# Patient Record
Sex: FEMALE | Race: WHITE | Employment: OTHER
[De-identification: names, ages, dates, MRNs, and addresses within clinical notes are randomized per-mention and may not be internally consistent; named-entity substitution may affect disease eponyms.]

---

## 2017-08-26 ENCOUNTER — SURGERY (OUTPATIENT)
Age: 52
End: 2017-08-26

## 2017-08-26 ENCOUNTER — ANESTHESIA (OUTPATIENT)
Dept: SURGERY | Facility: CLINIC | Age: 52
End: 2017-08-26
Payer: MEDICARE

## 2017-08-26 ENCOUNTER — HOSPITAL ENCOUNTER (OUTPATIENT)
Facility: CLINIC | Age: 52
Setting detail: OBSERVATION
Discharge: HOME | End: 2017-08-27
Attending: EMERGENCY MEDICINE | Admitting: SURGERY
Payer: MEDICARE

## 2017-08-26 ENCOUNTER — ANESTHESIA EVENT (OUTPATIENT)
Dept: SURGERY | Facility: CLINIC | Age: 52
End: 2017-08-26
Payer: MEDICARE

## 2017-08-26 ENCOUNTER — APPOINTMENT (OUTPATIENT)
Dept: CT IMAGING | Facility: CLINIC | Age: 52
End: 2017-08-26
Attending: EMERGENCY MEDICINE
Payer: MEDICARE

## 2017-08-26 DIAGNOSIS — K37 APPENDICITIS: ICD-10-CM

## 2017-08-26 DIAGNOSIS — Z90.49 S/P LAPAROSCOPIC APPENDECTOMY: Primary | ICD-10-CM

## 2017-08-26 DIAGNOSIS — K35.80 ACUTE APPENDICITIS, UNSPECIFIED ACUTE APPENDICITIS TYPE: ICD-10-CM

## 2017-08-26 LAB
ALBUMIN SERPL-MCNC: 3.2 G/DL (ref 3.4–5)
ALBUMIN UR-MCNC: 10 MG/DL
ALP SERPL-CCNC: 101 U/L (ref 40–150)
ALT SERPL W P-5'-P-CCNC: 68 U/L (ref 0–50)
ANION GAP SERPL CALCULATED.3IONS-SCNC: 8 MMOL/L (ref 3–14)
APPEARANCE UR: CLEAR
AST SERPL W P-5'-P-CCNC: 81 U/L (ref 0–45)
BACTERIA #/AREA URNS HPF: ABNORMAL /HPF
BASOPHILS # BLD AUTO: 0.1 10E9/L (ref 0–0.2)
BASOPHILS NFR BLD AUTO: 0.4 %
BILIRUB SERPL-MCNC: 0.8 MG/DL (ref 0.2–1.3)
BILIRUB UR QL STRIP: NEGATIVE
BUN SERPL-MCNC: 5 MG/DL (ref 7–30)
CALCIUM SERPL-MCNC: 9 MG/DL (ref 8.5–10.1)
CHLORIDE SERPL-SCNC: 106 MMOL/L (ref 94–109)
CO2 SERPL-SCNC: 25 MMOL/L (ref 20–32)
COLOR UR AUTO: YELLOW
CREAT SERPL-MCNC: 0.53 MG/DL (ref 0.52–1.04)
DIFFERENTIAL METHOD BLD: ABNORMAL
EOSINOPHIL # BLD AUTO: 0.9 10E9/L (ref 0–0.7)
EOSINOPHIL NFR BLD AUTO: 5.3 %
ERYTHROCYTE [DISTWIDTH] IN BLOOD BY AUTOMATED COUNT: 12.5 % (ref 10–15)
GFR SERPL CREATININE-BSD FRML MDRD: >90 ML/MIN/1.7M2
GLUCOSE SERPL-MCNC: 99 MG/DL (ref 70–99)
GLUCOSE UR STRIP-MCNC: NEGATIVE MG/DL
HCT VFR BLD AUTO: 37.6 % (ref 35–47)
HGB BLD-MCNC: 12.7 G/DL (ref 11.7–15.7)
HGB UR QL STRIP: NEGATIVE
IMM GRANULOCYTES # BLD: 0.1 10E9/L (ref 0–0.4)
IMM GRANULOCYTES NFR BLD: 0.4 %
KETONES UR STRIP-MCNC: NEGATIVE MG/DL
LEUKOCYTE ESTERASE UR QL STRIP: ABNORMAL
LYMPHOCYTES # BLD AUTO: 2.5 10E9/L (ref 0.8–5.3)
LYMPHOCYTES NFR BLD AUTO: 15.5 %
MCH RBC QN AUTO: 33.1 PG (ref 26.5–33)
MCHC RBC AUTO-ENTMCNC: 33.8 G/DL (ref 31.5–36.5)
MCV RBC AUTO: 98 FL (ref 78–100)
MONOCYTES # BLD AUTO: 2 10E9/L (ref 0–1.3)
MONOCYTES NFR BLD AUTO: 12.2 %
MUCOUS THREADS #/AREA URNS LPF: PRESENT /LPF
NEUTROPHILS # BLD AUTO: 10.7 10E9/L (ref 1.6–8.3)
NEUTROPHILS NFR BLD AUTO: 66.2 %
NITRATE UR QL: NEGATIVE
PH UR STRIP: 8.5 PH (ref 5–7)
PLATELET # BLD AUTO: 393 10E9/L (ref 150–450)
POTASSIUM SERPL-SCNC: 3.9 MMOL/L (ref 3.4–5.3)
PROT SERPL-MCNC: 7.9 G/DL (ref 6.8–8.8)
RBC # BLD AUTO: 3.84 10E12/L (ref 3.8–5.2)
RBC #/AREA URNS AUTO: 1 /HPF (ref 0–2)
SODIUM SERPL-SCNC: 139 MMOL/L (ref 133–144)
SOURCE: ABNORMAL
SP GR UR STRIP: 1.01 (ref 1–1.03)
SQUAMOUS #/AREA URNS AUTO: 1 /HPF (ref 0–1)
TRANS CELLS #/AREA URNS HPF: 2 /HPF (ref 0–1)
UROBILINOGEN UR STRIP-MCNC: NORMAL MG/DL (ref 0–2)
WBC # BLD AUTO: 16.2 10E9/L (ref 4–11)
WBC #/AREA URNS AUTO: 17 /HPF (ref 0–2)

## 2017-08-26 PROCEDURE — 25000125 ZZHC RX 250: Performed by: NURSE ANESTHETIST, CERTIFIED REGISTERED

## 2017-08-26 PROCEDURE — G0378 HOSPITAL OBSERVATION PER HR: HCPCS

## 2017-08-26 PROCEDURE — 25000128 H RX IP 250 OP 636: Performed by: SURGERY

## 2017-08-26 PROCEDURE — 99222 1ST HOSP IP/OBS MODERATE 55: CPT | Mod: 57 | Performed by: SURGERY

## 2017-08-26 PROCEDURE — 96365 THER/PROPH/DIAG IV INF INIT: CPT

## 2017-08-26 PROCEDURE — 25000132 ZZH RX MED GY IP 250 OP 250 PS 637: Mod: GY | Performed by: SURGERY

## 2017-08-26 PROCEDURE — 25000128 H RX IP 250 OP 636: Performed by: NURSE ANESTHETIST, CERTIFIED REGISTERED

## 2017-08-26 PROCEDURE — 27210794 ZZH OR GENERAL SUPPLY STERILE: Performed by: SURGERY

## 2017-08-26 PROCEDURE — 36000056 ZZH SURGERY LEVEL 3 1ST 30 MIN: Performed by: SURGERY

## 2017-08-26 PROCEDURE — 25000125 ZZHC RX 250: Performed by: EMERGENCY MEDICINE

## 2017-08-26 PROCEDURE — 74177 CT ABD & PELVIS W/CONTRAST: CPT

## 2017-08-26 PROCEDURE — 85025 COMPLETE CBC W/AUTO DIFF WBC: CPT | Performed by: EMERGENCY MEDICINE

## 2017-08-26 PROCEDURE — 25000125 ZZHC RX 250: Performed by: SURGERY

## 2017-08-26 PROCEDURE — 96361 HYDRATE IV INFUSION ADD-ON: CPT

## 2017-08-26 PROCEDURE — 44970 LAPAROSCOPY APPENDECTOMY: CPT | Performed by: SURGERY

## 2017-08-26 PROCEDURE — 37000009 ZZH ANESTHESIA TECHNICAL FEE, EACH ADDTL 15 MIN: Performed by: SURGERY

## 2017-08-26 PROCEDURE — 25000128 H RX IP 250 OP 636: Performed by: EMERGENCY MEDICINE

## 2017-08-26 PROCEDURE — 96375 TX/PRO/DX INJ NEW DRUG ADDON: CPT

## 2017-08-26 PROCEDURE — 37000008 ZZH ANESTHESIA TECHNICAL FEE, 1ST 30 MIN: Performed by: SURGERY

## 2017-08-26 PROCEDURE — 25000564 ZZH DESFLURANE, EA 15 MIN: Performed by: SURGERY

## 2017-08-26 PROCEDURE — 27110028 ZZH OR GENERAL SUPPLY NON-STERILE: Performed by: SURGERY

## 2017-08-26 PROCEDURE — 81001 URINALYSIS AUTO W/SCOPE: CPT | Performed by: EMERGENCY MEDICINE

## 2017-08-26 PROCEDURE — 99285 EMERGENCY DEPT VISIT HI MDM: CPT | Performed by: EMERGENCY MEDICINE

## 2017-08-26 PROCEDURE — 99285 EMERGENCY DEPT VISIT HI MDM: CPT | Mod: 25

## 2017-08-26 PROCEDURE — 25000564 ZZH DESFLURANE, EA 15 MIN

## 2017-08-26 PROCEDURE — 36000058 ZZH SURGERY LEVEL 3 EA 15 ADDTL MIN: Performed by: SURGERY

## 2017-08-26 PROCEDURE — 88304 TISSUE EXAM BY PATHOLOGIST: CPT | Performed by: SURGERY

## 2017-08-26 PROCEDURE — A9270 NON-COVERED ITEM OR SERVICE: HCPCS | Mod: GY | Performed by: SURGERY

## 2017-08-26 PROCEDURE — 88304 TISSUE EXAM BY PATHOLOGIST: CPT | Mod: 26 | Performed by: SURGERY

## 2017-08-26 PROCEDURE — 80053 COMPREHEN METABOLIC PANEL: CPT | Performed by: EMERGENCY MEDICINE

## 2017-08-26 PROCEDURE — 71000014 ZZH RECOVERY PHASE 1 LEVEL 2 FIRST HR: Performed by: SURGERY

## 2017-08-26 PROCEDURE — 40000671 ZZH STATISTIC ANESTHESIA CASE

## 2017-08-26 RX ORDER — CROMOLYN SODIUM 40 MG/ML
2 SOLUTION/ DROPS OPHTHALMIC 4 TIMES DAILY
COMMUNITY
End: 2021-05-24

## 2017-08-26 RX ORDER — CIPROFLOXACIN 2 MG/ML
400 INJECTION, SOLUTION INTRAVENOUS ONCE
Status: COMPLETED | OUTPATIENT
Start: 2017-08-26 | End: 2017-08-26

## 2017-08-26 RX ORDER — CHLORHEXIDINE GLUCONATE 40 MG/ML
SOLUTION TOPICAL ONCE
Status: DISCONTINUED | OUTPATIENT
Start: 2017-08-26 | End: 2017-08-27 | Stop reason: HOSPADM

## 2017-08-26 RX ORDER — ONDANSETRON 2 MG/ML
INJECTION INTRAMUSCULAR; INTRAVENOUS PRN
Status: DISCONTINUED | OUTPATIENT
Start: 2017-08-26 | End: 2017-08-26

## 2017-08-26 RX ORDER — GLYCOPYRROLATE 0.2 MG/ML
INJECTION, SOLUTION INTRAMUSCULAR; INTRAVENOUS PRN
Status: DISCONTINUED | OUTPATIENT
Start: 2017-08-26 | End: 2017-08-26

## 2017-08-26 RX ORDER — TRIAMCINOLONE ACETONIDE 1 MG/ML
LOTION TOPICAL 2 TIMES DAILY
COMMUNITY

## 2017-08-26 RX ORDER — KETOROLAC TROMETHAMINE 30 MG/ML
INJECTION, SOLUTION INTRAMUSCULAR; INTRAVENOUS PRN
Status: DISCONTINUED | OUTPATIENT
Start: 2017-08-26 | End: 2017-08-26

## 2017-08-26 RX ORDER — NALOXONE HYDROCHLORIDE 0.4 MG/ML
.1-.4 INJECTION, SOLUTION INTRAMUSCULAR; INTRAVENOUS; SUBCUTANEOUS
Status: DISCONTINUED | OUTPATIENT
Start: 2017-08-26 | End: 2017-08-27 | Stop reason: HOSPADM

## 2017-08-26 RX ORDER — SODIUM CHLORIDE, SODIUM LACTATE, POTASSIUM CHLORIDE, CALCIUM CHLORIDE 600; 310; 30; 20 MG/100ML; MG/100ML; MG/100ML; MG/100ML
INJECTION, SOLUTION INTRAVENOUS CONTINUOUS
Status: DISCONTINUED | OUTPATIENT
Start: 2017-08-26 | End: 2017-08-27 | Stop reason: HOSPADM

## 2017-08-26 RX ORDER — FENTANYL CITRATE 50 UG/ML
25-50 INJECTION, SOLUTION INTRAMUSCULAR; INTRAVENOUS
Status: DISCONTINUED | OUTPATIENT
Start: 2017-08-26 | End: 2017-08-26 | Stop reason: HOSPADM

## 2017-08-26 RX ORDER — ONDANSETRON 2 MG/ML
4 INJECTION INTRAMUSCULAR; INTRAVENOUS ONCE
Status: COMPLETED | OUTPATIENT
Start: 2017-08-26 | End: 2017-08-26

## 2017-08-26 RX ORDER — PROPOFOL 10 MG/ML
INJECTION, EMULSION INTRAVENOUS PRN
Status: DISCONTINUED | OUTPATIENT
Start: 2017-08-26 | End: 2017-08-26

## 2017-08-26 RX ORDER — LIDOCAINE HYDROCHLORIDE 10 MG/ML
INJECTION, SOLUTION INFILTRATION; PERINEURAL PRN
Status: DISCONTINUED | OUTPATIENT
Start: 2017-08-26 | End: 2017-08-26

## 2017-08-26 RX ORDER — SODIUM CHLORIDE 9 MG/ML
1000 INJECTION, SOLUTION INTRAVENOUS CONTINUOUS
Status: DISCONTINUED | OUTPATIENT
Start: 2017-08-26 | End: 2017-08-26

## 2017-08-26 RX ORDER — CLOBETASOL PROPIONATE 0.5 MG/ML
SOLUTION TOPICAL 2 TIMES DAILY
COMMUNITY

## 2017-08-26 RX ORDER — ONDANSETRON 4 MG/1
4 TABLET, ORALLY DISINTEGRATING ORAL EVERY 6 HOURS PRN
Status: DISCONTINUED | OUTPATIENT
Start: 2017-08-26 | End: 2017-08-27 | Stop reason: HOSPADM

## 2017-08-26 RX ORDER — HYDRALAZINE HYDROCHLORIDE 20 MG/ML
2.5-5 INJECTION INTRAMUSCULAR; INTRAVENOUS EVERY 10 MIN PRN
Status: DISCONTINUED | OUTPATIENT
Start: 2017-08-26 | End: 2017-08-26 | Stop reason: HOSPADM

## 2017-08-26 RX ORDER — HYDROMORPHONE HYDROCHLORIDE 1 MG/ML
.3-.5 INJECTION, SOLUTION INTRAMUSCULAR; INTRAVENOUS; SUBCUTANEOUS EVERY 5 MIN PRN
Status: DISCONTINUED | OUTPATIENT
Start: 2017-08-26 | End: 2017-08-26 | Stop reason: HOSPADM

## 2017-08-26 RX ORDER — FLUTICASONE PROPIONATE 50 MCG
1 SPRAY, SUSPENSION (ML) NASAL DAILY
COMMUNITY

## 2017-08-26 RX ORDER — FENTANYL CITRATE 50 UG/ML
75 INJECTION, SOLUTION INTRAMUSCULAR; INTRAVENOUS ONCE
Status: COMPLETED | OUTPATIENT
Start: 2017-08-26 | End: 2017-08-26

## 2017-08-26 RX ORDER — CIPROFLOXACIN 2 MG/ML
INJECTION, SOLUTION INTRAVENOUS PRN
Status: DISCONTINUED | OUTPATIENT
Start: 2017-08-26 | End: 2017-08-26

## 2017-08-26 RX ORDER — KETOROLAC TROMETHAMINE 30 MG/ML
30 INJECTION, SOLUTION INTRAMUSCULAR; INTRAVENOUS
Status: DISCONTINUED | OUTPATIENT
Start: 2017-08-26 | End: 2017-08-26 | Stop reason: HOSPADM

## 2017-08-26 RX ORDER — CHLORHEXIDINE GLUCONATE ORAL RINSE 1.2 MG/ML
15 SOLUTION DENTAL 2 TIMES DAILY
COMMUNITY
End: 2021-05-24

## 2017-08-26 RX ORDER — OXYCODONE AND ACETAMINOPHEN 5; 325 MG/1; MG/1
1-2 TABLET ORAL EVERY 4 HOURS PRN
Status: DISCONTINUED | OUTPATIENT
Start: 2017-08-26 | End: 2017-08-26 | Stop reason: HOSPADM

## 2017-08-26 RX ORDER — KETOROLAC TROMETHAMINE 30 MG/ML
30 INJECTION, SOLUTION INTRAMUSCULAR; INTRAVENOUS EVERY 6 HOURS PRN
Status: DISCONTINUED | OUTPATIENT
Start: 2017-08-26 | End: 2017-08-27 | Stop reason: HOSPADM

## 2017-08-26 RX ORDER — SODIUM CHLORIDE, SODIUM LACTATE, POTASSIUM CHLORIDE, CALCIUM CHLORIDE 600; 310; 30; 20 MG/100ML; MG/100ML; MG/100ML; MG/100ML
INJECTION, SOLUTION INTRAVENOUS CONTINUOUS
Status: DISCONTINUED | OUTPATIENT
Start: 2017-08-26 | End: 2017-08-26 | Stop reason: HOSPADM

## 2017-08-26 RX ORDER — ONDANSETRON 2 MG/ML
4 INJECTION INTRAMUSCULAR; INTRAVENOUS EVERY 30 MIN PRN
Status: DISCONTINUED | OUTPATIENT
Start: 2017-08-26 | End: 2017-08-26 | Stop reason: HOSPADM

## 2017-08-26 RX ORDER — ONDANSETRON 2 MG/ML
4 INJECTION INTRAMUSCULAR; INTRAVENOUS EVERY 6 HOURS PRN
Status: DISCONTINUED | OUTPATIENT
Start: 2017-08-26 | End: 2017-08-27 | Stop reason: HOSPADM

## 2017-08-26 RX ORDER — FENTANYL CITRATE 50 UG/ML
INJECTION, SOLUTION INTRAMUSCULAR; INTRAVENOUS PRN
Status: DISCONTINUED | OUTPATIENT
Start: 2017-08-26 | End: 2017-08-26

## 2017-08-26 RX ORDER — OXYCODONE AND ACETAMINOPHEN 5; 325 MG/1; MG/1
1-2 TABLET ORAL EVERY 4 HOURS PRN
Status: DISCONTINUED | OUTPATIENT
Start: 2017-08-26 | End: 2017-08-27 | Stop reason: HOSPADM

## 2017-08-26 RX ORDER — HYDROMORPHONE HYDROCHLORIDE 1 MG/ML
.3-.5 INJECTION, SOLUTION INTRAMUSCULAR; INTRAVENOUS; SUBCUTANEOUS
Status: DISCONTINUED | OUTPATIENT
Start: 2017-08-26 | End: 2017-08-27 | Stop reason: HOSPADM

## 2017-08-26 RX ORDER — METOCLOPRAMIDE HYDROCHLORIDE 5 MG/ML
10 INJECTION INTRAMUSCULAR; INTRAVENOUS EVERY 6 HOURS
Status: DISCONTINUED | OUTPATIENT
Start: 2017-08-26 | End: 2017-08-27 | Stop reason: HOSPADM

## 2017-08-26 RX ORDER — PROMETHAZINE HYDROCHLORIDE 25 MG/ML
25 INJECTION, SOLUTION INTRAMUSCULAR; INTRAVENOUS EVERY 6 HOURS PRN
Status: DISCONTINUED | OUTPATIENT
Start: 2017-08-26 | End: 2017-08-27 | Stop reason: HOSPADM

## 2017-08-26 RX ORDER — ONDANSETRON 4 MG/1
4 TABLET, ORALLY DISINTEGRATING ORAL EVERY 30 MIN PRN
Status: DISCONTINUED | OUTPATIENT
Start: 2017-08-26 | End: 2017-08-26 | Stop reason: HOSPADM

## 2017-08-26 RX ORDER — BUPIVACAINE HYDROCHLORIDE AND EPINEPHRINE 2.5; 5 MG/ML; UG/ML
INJECTION, SOLUTION INFILTRATION; PERINEURAL PRN
Status: DISCONTINUED | OUTPATIENT
Start: 2017-08-26 | End: 2017-08-26 | Stop reason: HOSPADM

## 2017-08-26 RX ORDER — MEPERIDINE HYDROCHLORIDE 25 MG/ML
12.5 INJECTION INTRAMUSCULAR; INTRAVENOUS; SUBCUTANEOUS EVERY 5 MIN PRN
Status: DISCONTINUED | OUTPATIENT
Start: 2017-08-26 | End: 2017-08-26 | Stop reason: HOSPADM

## 2017-08-26 RX ORDER — DEXAMETHASONE SODIUM PHOSPHATE 4 MG/ML
INJECTION, SOLUTION INTRA-ARTICULAR; INTRALESIONAL; INTRAMUSCULAR; INTRAVENOUS; SOFT TISSUE PRN
Status: DISCONTINUED | OUTPATIENT
Start: 2017-08-26 | End: 2017-08-26

## 2017-08-26 RX ORDER — IOPAMIDOL 755 MG/ML
94 INJECTION, SOLUTION INTRAVASCULAR ONCE
Status: COMPLETED | OUTPATIENT
Start: 2017-08-26 | End: 2017-08-26

## 2017-08-26 RX ADMIN — HYDROMORPHONE HYDROCHLORIDE 1 MG: 1 INJECTION, SOLUTION INTRAMUSCULAR; INTRAVENOUS; SUBCUTANEOUS at 16:46

## 2017-08-26 RX ADMIN — FENTANYL CITRATE 100 MCG: 50 INJECTION, SOLUTION INTRAMUSCULAR; INTRAVENOUS at 16:26

## 2017-08-26 RX ADMIN — CIPROFLOXACIN 400 MG: 2 INJECTION INTRAVENOUS at 14:46

## 2017-08-26 RX ADMIN — CIPROFLOXACIN 400 MG: 2 INJECTION, SOLUTION INTRAVENOUS at 15:44

## 2017-08-26 RX ADMIN — MIDAZOLAM HYDROCHLORIDE 2 MG: 1 INJECTION, SOLUTION INTRAMUSCULAR; INTRAVENOUS at 16:21

## 2017-08-26 RX ADMIN — Medication 100 MG: at 16:28

## 2017-08-26 RX ADMIN — KETOROLAC TROMETHAMINE 30 MG: 30 INJECTION, SOLUTION INTRAMUSCULAR at 16:40

## 2017-08-26 RX ADMIN — METOCLOPRAMIDE 10 MG: 5 INJECTION, SOLUTION INTRAMUSCULAR; INTRAVENOUS at 18:54

## 2017-08-26 RX ADMIN — FENTANYL CITRATE 100 MCG: 50 INJECTION, SOLUTION INTRAMUSCULAR; INTRAVENOUS at 16:23

## 2017-08-26 RX ADMIN — METOCLOPRAMIDE 10 MG: 5 INJECTION, SOLUTION INTRAMUSCULAR; INTRAVENOUS at 23:39

## 2017-08-26 RX ADMIN — PROPOFOL 200 MG: 10 INJECTION, EMULSION INTRAVENOUS at 16:28

## 2017-08-26 RX ADMIN — DEXAMETHASONE SODIUM PHOSPHATE 4 MG: 4 INJECTION, SOLUTION INTRA-ARTICULAR; INTRALESIONAL; INTRAMUSCULAR; INTRAVENOUS; SOFT TISSUE at 16:28

## 2017-08-26 RX ADMIN — FENTANYL CITRATE 50 MCG: 50 INJECTION INTRAMUSCULAR; INTRAVENOUS at 17:48

## 2017-08-26 RX ADMIN — BUPIVACAINE HYDROCHLORIDE AND EPINEPHRINE BITARTRATE 28 ML: 2.5; .005 INJECTION, SOLUTION INFILTRATION; PERINEURAL at 17:04

## 2017-08-26 RX ADMIN — OXYCODONE HYDROCHLORIDE AND ACETAMINOPHEN 2 TABLET: 5; 325 TABLET ORAL at 23:39

## 2017-08-26 RX ADMIN — FENTANYL CITRATE 100 MCG: 50 INJECTION, SOLUTION INTRAMUSCULAR; INTRAVENOUS at 16:52

## 2017-08-26 RX ADMIN — IOPAMIDOL 94 ML: 755 INJECTION, SOLUTION INTRAVENOUS at 14:32

## 2017-08-26 RX ADMIN — FENTANYL CITRATE 50 MCG: 50 INJECTION INTRAMUSCULAR; INTRAVENOUS at 17:37

## 2017-08-26 RX ADMIN — SODIUM CHLORIDE, POTASSIUM CHLORIDE, SODIUM LACTATE AND CALCIUM CHLORIDE: 600; 310; 30; 20 INJECTION, SOLUTION INTRAVENOUS at 18:50

## 2017-08-26 RX ADMIN — ONDANSETRON 4 MG: 2 INJECTION INTRAMUSCULAR; INTRAVENOUS at 13:42

## 2017-08-26 RX ADMIN — GLYCOPYRROLATE 0.2 MG: 0.2 INJECTION, SOLUTION INTRAMUSCULAR; INTRAVENOUS at 16:28

## 2017-08-26 RX ADMIN — OXYCODONE HYDROCHLORIDE AND ACETAMINOPHEN 1 TABLET: 5; 325 TABLET ORAL at 18:53

## 2017-08-26 RX ADMIN — KETOROLAC TROMETHAMINE 30 MG: 30 INJECTION, SOLUTION INTRAMUSCULAR at 22:10

## 2017-08-26 RX ADMIN — FENTANYL CITRATE 75 MCG: 50 INJECTION INTRAMUSCULAR; INTRAVENOUS at 13:41

## 2017-08-26 RX ADMIN — MIDAZOLAM HYDROCHLORIDE 1 MG: 1 INJECTION, SOLUTION INTRAMUSCULAR; INTRAVENOUS at 16:26

## 2017-08-26 RX ADMIN — SODIUM CHLORIDE: 9 INJECTION, SOLUTION INTRAVENOUS at 14:00

## 2017-08-26 RX ADMIN — SODIUM CHLORIDE 64 ML: 9 INJECTION, SOLUTION INTRAVENOUS at 14:32

## 2017-08-26 RX ADMIN — ONDANSETRON 4 MG: 2 INJECTION INTRAMUSCULAR; INTRAVENOUS at 16:28

## 2017-08-26 RX ADMIN — MIDAZOLAM HYDROCHLORIDE 2 MG: 1 INJECTION, SOLUTION INTRAMUSCULAR; INTRAVENOUS at 16:23

## 2017-08-26 RX ADMIN — SODIUM CHLORIDE 1000 ML: 9 INJECTION, SOLUTION INTRAVENOUS at 15:48

## 2017-08-26 RX ADMIN — FENTANYL CITRATE 50 MCG: 50 INJECTION, SOLUTION INTRAMUSCULAR; INTRAVENOUS at 16:21

## 2017-08-26 RX ADMIN — LIDOCAINE HYDROCHLORIDE 50 MG: 10 INJECTION, SOLUTION INFILTRATION; PERINEURAL at 16:28

## 2017-08-26 RX ADMIN — SODIUM CHLORIDE 500 ML: 9 INJECTION, SOLUTION INTRAVENOUS at 13:39

## 2017-08-26 ASSESSMENT — ENCOUNTER SYMPTOMS
APPETITE CHANGE: 1
CONFUSION: 0
DIZZINESS: 0
DYSURIA: 0
VOMITING: 1
WEAKNESS: 0
NAUSEA: 1
BACK PAIN: 0
ABDOMINAL PAIN: 1
TROUBLE SWALLOWING: 0
LIGHT-HEADEDNESS: 0
CHILLS: 0
DIARRHEA: 1
BRUISES/BLEEDS EASILY: 0
SHORTNESS OF BREATH: 0
CHEST TIGHTNESS: 0
FEVER: 0
ACTIVITY CHANGE: 1
MYALGIAS: 0
HEADACHES: 0

## 2017-08-26 ASSESSMENT — ACTIVITIES OF DAILY LIVING (ADL)
RETIRED_EATING: 0-->INDEPENDENT
DRESS: 0-->INDEPENDENT
COGNITION: 0 - NO COGNITION ISSUES REPORTED
TOILETING: 0-->INDEPENDENT
FALL_HISTORY_WITHIN_LAST_SIX_MONTHS: NO
BATHING: 0-->INDEPENDENT
TRANSFERRING: 0-->INDEPENDENT
SWALLOWING: 0-->SWALLOWS FOODS/LIQUIDS WITHOUT DIFFICULTY
AMBULATION: 0-->INDEPENDENT
RETIRED_COMMUNICATION: 0-->UNDERSTANDS/COMMUNICATES WITHOUT DIFFICULTY

## 2017-08-26 ASSESSMENT — LIFESTYLE VARIABLES: TOBACCO_USE: 1

## 2017-08-26 NOTE — OP NOTE
Preoperative diagnosis: Acute appendicitis    Postoperative diagnosis: Same    Procedure: Laparoscopic appendectomy    Surgeon: Ajit    Anesthesia: ADIA Jones    Procedure: Patient's abdomen cleaned and draped in a sterile manner. Cipro and Flagyl were given in the emergency room. 1/4% Marcaine with epinephrine was used to anesthetize all port sites. A small left lower quadrant transverse incision made and subcutaneous tissue dissected fascia. Fascia was opened sharply and using a staggered muscle-splitting technique abdomen was entered. 12 mm Marley trocar inserted and carbon dioxide was insufflated to a pressure of 15 mmHg. Under direct vision, 2 additional midline 5 mm trochars were also placed. The patient was placed into Trendelenburg position. Attention was turned to the right lower quadrant. Cecum was located and appendix isolated. It was inflamed but not perforated. The appendix was gently grasped and elevated. Mesoappendix was taken down using the LigaSure device. An 0 Vicryl Endoloop was placed around the base and cinched snug. The appendix was then amputated using the LigaSure device. It was placed in an Endo Catch bag and brought out through the left lower quadrant incision. 2 L of warm normal saline solution was used to irrigate out the abdominal cavity and this was suctioned free until the implant was clear. Final inspection of the appendiceal stump revealed an intact stump with no evidence of hemorrhage or leakage. All trochars removed under direct vision and their lab to desufflate. All wounds were irrigated with normal saline and the skin closed using 4-0 Vicryl in a subcuticular fashion and dressed with Dermabond.    Estimated blood loss: 5 mL    IV fluid: 550 mL

## 2017-08-26 NOTE — BRIEF OP NOTE
PreOp Dx: acute appendicitis    PostOp Dx: Same    Procedure: lap appy    Surgeon: RADHA Fong    Anesthesia: KADI Jones CRNA    Findings: acute appy, non-perforated    IVF: 550ml    UOP: n/a    EBL: 5ml      Lazaro Fong MD

## 2017-08-26 NOTE — ANESTHESIA CARE TRANSFER NOTE
Patient: Ruth Morin    * No procedures listed *    Diagnosis: * No pre-op diagnosis entered *  Diagnosis Additional Information: No value filed.    Anesthesia Type:   General, ETT     Note:  Airway :Face Mask  Patient transferred to:PACU        Vitals: (Last set prior to Anesthesia Care Transfer)    CRNA VITALS  8/26/2017 1634 - 8/26/2017 1707      8/26/2017             Pulse: 115    SpO2: 100 %    Resp Rate (observed): 15                Electronically Signed By: BALDEMAR Mullen CRNA  August 26, 2017  5:07 PM

## 2017-08-26 NOTE — ED NOTES
"Lower abdominal pain with N/V/D since last night. Emesis x 6 and diarrhea x 10+. Pt also states inadequate pain control from recent breast reduction surgery and has been scared recovering at home without any help. Pt states to this nurse \" I need some extra attention and care!\".  "

## 2017-08-26 NOTE — ED NOTES
Pt transported down to OR by Maura RN, patient belongings bag x 2 transported to ICU per request of Maura MILLARD for post op recovery.

## 2017-08-26 NOTE — IP AVS SNAPSHOT
[facility]    [address] [address]    Phone:  [phone]    Fax:  [phone]                                       After Visit Summary   8/26/2017    Ruth Morin    MRN: [id number]           After Visit Summary Signature Page     I have received my discharge instructions, and my questions have been answered. I have discussed any challenges I see with this plan with the nurse or doctor.    ..........................................................................................................................................  Patient/Patient Representative Signature      ..........................................................................................................................................  Patient Representative Print Name and Relationship to Patient    ..................................................               ................................................  Date                                            Time    ..........................................................................................................................................  Reviewed by Signature/Title    ...................................................              ..............................................  Date                                                            Time

## 2017-08-26 NOTE — CONSULTS
Asked by Dr. Ibrahim to see patient regarding her abdominal pain.    52-year-old female complaining of right lower quadrant abdominal pain for 24 hours. Patient reports the pain has been increasing in intensity and is now a 6 out of 10. There is no radiation. Aggravated by movement and alleviated by lying still. Pain is sharp and achy. Patient reports nausea with vomiting. She also reports diarrhea and shaking chills. No other associated symptoms. Patient has not had pain like this in the past.    Patient Active Problem List   Diagnosis     Chronic generalized abdominal pain     AR (allergic rhinitis)       Past Medical History:   Diagnosis Date     Anxiety state, unspecified      Back pain      Diffuse cystic mastopathy      Ovarian cyst      Postcoital bleeding      Tobacco use disorder      Unspecified sinusitis (chronic)      Urinary tract infection, site not specified        Past Surgical History:   Procedure Laterality Date     ESOPHAGOSCOPY, GASTROSCOPY, DUODENOSCOPY (EGD), COMBINED  1/18/2013    Procedure: COMBINED ESOPHAGOSCOPY, GASTROSCOPY, DUODENOSCOPY (EGD), BIOPSY SINGLE OR MULTIPLE;  Gastroscopy;  Surgeon: Leann Alicea MD;  Location: [facility]     GASTRIC BYPASS      x 2     HERNIA REPAIR      with mesh     TUBAL LIGATION         History reviewed. No pertinent family history.    Social History   Substance Use Topics     Smoking status: Current Every Day Smoker     Smokeless tobacco: Never Used      Comment: 1-2 cigarettes     Alcohol use Yes      Comment: social        History   Drug Use No       Current Outpatient Prescriptions   Medication Sig Dispense Refill     clobetasol (TEMOVATE) 0.05 % external solution Apply topically 2 times daily       cromolyn (OPTICROM) 4 % ophthalmic solution Place 2 drops into both eyes 4 times daily       triamcinolone (KENALOG) 0.1 % lotion Apply topically 2 times daily       chlorhexidine (PERIDEX) 0.12 % solution Swish and spit 15 mLs in mouth 2 times daily        fluticasone (FLONASE) 50 MCG/ACT spray Spray 1 spray into both nostrils daily       Multiple Vitamin (MULTIVITAMIN) per tablet Take 1 tablet by mouth daily. 100 tablet 12       Allergies   Allergen Reactions     Vicodin [Hydrocodone-Acetaminophen] Other (See Comments) and Rash     Uncontrollable shaking.  Per PT.     Acyclovir Other (See Comments), Hives and Rash     Cold sores     Augmentin Other (See Comments), Hives and Rash     Cold sores       Benadryl [Pharbedryl] Other (See Comments), Hives and Rash     Cold sores     Cephalosporins Other (See Comments), Hives and Rash     Cold sores     Sulfa Drugs Other (See Comments), Hives and Rash     Cold sores         CBC  Recent Labs   Lab Test  08/26/17   1310   WBC  16.2*   RBC  3.84   HGB  12.7   HCT  37.6   MCV  98   MCH  33.1*   MCHC  33.8   RDW  12.5   PLT  393       BMP  Recent Labs   Lab Test  08/26/17   1310   NA  139   POTASSIUM  3.9   DEEPAK  9.0   CHLORIDE  106   CO2  25   BUN  5*   CR  0.53   GLC  99       LFTs  Recent Labs   Lab Test  08/26/17   1310   PROTTOTAL  7.9   ALBUMIN  3.2*   BILITOTAL  0.8   ALKPHOS  101   AST  81*   ALT  68*     Results for orders placed or performed during the hospital encounter of 08/26/17   CT Abdomen Pelvis w Contrast    Narrative    CT ABDOMEN AND PELVIS WITH CONTRAST   8/26/2017 2:44 PM     HISTORY: Right lower quadrant pain.    TECHNIQUE: 94 mL Isovue 370 IV were administered. After contrast  administration, volumetric helical sections were acquired from the  lung bases to the ischial tuberosities. Coronal images were also  reconstructed. Radiation dose for this scan was reduced using  automated exposure control, adjustment of the mA and/or kV according  to patient size, or iterative reconstruction technique.    COMPARISON: CT of the abdomen and pelvis performed 5/10/2013.     FINDINGS: The appendix is thickened, measuring up to 1 cm in diameter.  There is minimal associated periappendiceal fat stranding.  No  appendicoliths are identified within the appendix. No evidence for  periappendiceal abscess or perforation.     No free fluid in the pelvis. The uterus is not seen, and may be  surgically absent. Postoperative changes are noted involving the  stomach. No bowel obstruction. No free intraperitoneal air. The  gallbladder is not visualized. The liver, spleen, adrenal glands,  pancreas, and kidneys are unremarkable. No hydronephrosis. The lung  bases are clear. Bilateral L5 spondylolysis is unchanged.       Impression    IMPRESSION: The appendix is mildly thickened, measuring up to 1 cm in  diameter, with minimal periappendiceal fat stranding. A mild or early  appendicitis could have this appearance. Clinical correlation and  follow-up are recommended.    TRACEY ANTOINE MD     ROS  Constitutional - Denies fevers, weight loss, malaise, lethargy  Neuro - Denies tremors or seizures  Pulmon - Denies SOB, dyspnea, hemoptysis, chronic cough or use of an inhaler  CV - Denies CP, SOB, lower extremity edema, difficulty w/ stairs, has never used NTG  GI - Denies hematemesis, BRBPR, melena, chronic diarrhea or epigastric pain   - Denies hematuria, difficulty voiding, h/o STDs  Hematology - Denies blood clotting disorders, chronic anemias  Dermatology - No melanomas or skin cancers  Rheumatology - No h/o RA  Pysch - Denies depression, bipolar d/o or schizophrenia    Exam:    General - Alert and Oriented X4, NAD, well nourished  HEENT - Normocephalic, atraumatic, PERRLA, Nose midline, Throat without lesions  Neck - supple, no LAD, Thyroid normal, Carotids without bruits  Lungs - Clear to auscultation bilaterally with good inspiratory effort, no tactile fremitus  CV - Heart RRR, no lift's, thrills, murmurs, rubs, or gallops. Carotid, radial, and femoral pulses 2+ bilaterally  Abdomen - Soft, tender to palpation right lower quadrant with voluntary guarding, large midline laparotomy scar noted, +BS, no hepatosplenomegaly, no  palpable masses  Neuro - Full ROM, Strength 5/5 and major muscle groups, sensation intact  Extremities - No cyanosis, clubbing or edema    Assessment and plan: 52-year-old female with acute appendicitis. Patient is good candidate for laparoscopic appendectomy. Risks benefits alternatives and complications were discussed with the patient including the possibility of infection, bleeding, or abscess formation. Patient understood and wished to proceed. At about X were started in the emergency room.  PATIENT IS CLEARED FOR SURGERY.    Lazaro Fong MD

## 2017-08-26 NOTE — ED NOTES
Presents through triage with c/o abdominal pain with vomiting and diarrhea. Breast reduction surgery 1 week ago without complications.

## 2017-08-26 NOTE — PLAN OF CARE
"Problem: Goal Outcome Summary  Goal: Goal Outcome Summary  Outcome: Improving  [facility] ADMISSION NOTE     Patient admitted to room 2404 at approximately 1830 via cart from surgery. Patient was accompanied by nurse.      Verbal SBAR report received from Pat prior to patient arrival.      Patient transferred to bed via air abhijeet. Patient alert and oriented X 3. Pain is controlled with current analgesics.  Admission vital signs: Blood pressure 109/79, pulse 82, temperature 96.7  F (35.9  C), temperature source Oral, resp. rate 16, height 1.6 m (5' 3\"), weight 86.2 kg (190 lb), SpO2 94 %, not currently breastfeeding. Patient was oriented to room, call light, plan of care, visiting hours.      The following safety risks were identified during admission: none. Yellow risk band applied: VIVIAN Palacio          "

## 2017-08-26 NOTE — ED PROVIDER NOTES
History     Chief Complaint   Patient presents with     Abdominal Pain     HPI  Ruth Morin is a 52 year old female who presents to emergency department complaining of lower right quadrant abdominal pain.  Symptoms present since last night when she experienced sweats which she describes as an upset stomach.  She took some Maalox but this did not improve then later in the evening she began having lower abdominal pain that she rates a 10 out of 10 and being sharp in nature it did not radiate anywhere.  Patient has had some diarrhea but has not vomited she does have some nausea.  She denies any urinary symptoms she denies any flank pain.  Patient had a breast reduction surgery on August 18 which was well and she is having no problems with this.  She does not take she's had a fever.  She currently rates her pain a 8 out of 10 pain is worse with movement.      I have reviewed the Medications, Allergies, Past Medical and Surgical History, and Social History in the Epic system.    Allergies:   Allergies   Allergen Reactions     Vicodin [Hydrocodone-Acetaminophen] Other (See Comments) and Rash     Uncontrollable shaking.  Per PT.     Acyclovir Other (See Comments), Hives and Rash     Cold sores     Augmentin Other (See Comments), Hives and Rash     Cold sores       Benadryl [Pharbedryl] Other (See Comments), Hives and Rash     Cold sores     Cephalosporins Other (See Comments), Hives and Rash     Cold sores     Sulfa Drugs Other (See Comments), Hives and Rash     Cold sores         No current facility-administered medications on file prior to encounter.   Current Outpatient Prescriptions on File Prior to Encounter:  QVAR 80 MCG/ACT Inhaler Apply 1 puff into each nare 2 times daily. 1 puff each nostril with adapter 2 times daily   levocetirizine (XYZAL) 5 MG tablet Take 1 tablet by mouth every evening.   Probiotic Product (PROBIOTIC COLON SUPPORT) CAPS Take 1 capsule by mouth daily (with breakfast).  "  Cholecalciferol (VITAMIN D3 PO) Take 1,000 Units by mouth daily.   Fiber, Guar Gum, CHEW Take 1 tablet by mouth daily.   IRON PO Take 1 tablet by mouth daily.   pramoxine (PROCTOFOAM) 1 % foam Place 1 tablet rectally every 2 hours as needed.   Multiple Vitamin (MULTIVITAMIN) per tablet Take 1 tablet by mouth daily.   cyanocobalamin 1000 MCG/ML injection Inject 1 mL as directed every 30 days.       Patient Active Problem List   Diagnosis     Chronic generalized abdominal pain     AR (allergic rhinitis)       Past Surgical History:   Procedure Laterality Date     ESOPHAGOSCOPY, GASTROSCOPY, DUODENOSCOPY (EGD), COMBINED  1/18/2013    Procedure: COMBINED ESOPHAGOSCOPY, GASTROSCOPY, DUODENOSCOPY (EGD), BIOPSY SINGLE OR MULTIPLE;  Gastroscopy;  Surgeon: Leann Alicea MD;  Location: [facility]       Social History   Substance Use Topics     Smoking status: Current Every Day Smoker     Smokeless tobacco: Never Used      Comment: 1-2 cigarettes     Alcohol use Yes      Comment: social         There is no immunization history on file for this patient.    BMI: Estimated body mass index is 33.66 kg/(m^2) as calculated from the following:    Height as of this encounter: 1.6 m (5' 3\").    Weight as of this encounter: 86.2 kg (190 lb).      Review of Systems   Constitutional: Positive for activity change and appetite change. Negative for chills and fever.   HENT: Negative for congestion and trouble swallowing.    Respiratory: Negative for chest tightness and shortness of breath.    Cardiovascular: Negative for chest pain.   Gastrointestinal: Positive for abdominal pain, diarrhea, nausea and vomiting.   Genitourinary: Negative for decreased urine volume and dysuria.   Musculoskeletal: Negative for back pain, gait problem and myalgias.   Skin: Negative for rash.   Neurological: Negative for dizziness, weakness, light-headedness and headaches.   Hematological: Does not bruise/bleed easily.   Psychiatric/Behavioral: " "Negative for confusion.       Physical Exam   BP: (!) 129/96  Pulse: 92  Temp: 98  F (36.7  C)  Resp: 20  Height: 160 cm (5' 3\")  Weight: 86.2 kg (190 lb)  SpO2: 98 %  Physical Exam   Constitutional: She appears well-developed and well-nourished. No distress.   HENT:   Head: Normocephalic.   Mouth/Throat: Oropharynx is clear and moist.   Neck: Normal range of motion. Neck supple.   Cardiovascular: Normal rate, regular rhythm, normal heart sounds and intact distal pulses.    No murmur heard.  Pulmonary/Chest: Effort normal and breath sounds normal. She has no wheezes.   Abdominal:   Soft nondistended moderate obesity with tenderness to palpation of the right lower quadrant.  Mild guarding no rebound.  Bowel sounds are positive.   Musculoskeletal: Normal range of motion. She exhibits no tenderness or deformity.   Neurological: She is alert. She exhibits normal muscle tone.   Skin: Skin is warm and dry. No rash noted.   Psychiatric: She has a normal mood and affect.   Nursing note and vitals reviewed.      ED Course     ED Course     Procedures             Critical Care time:  none               Labs Ordered and Resulted from Time of ED Arrival Up to the Time of Departure from the ED   CBC WITH PLATELETS DIFFERENTIAL - Abnormal; Notable for the following:        Result Value    WBC 16.2 (*)     MCH 33.1 (*)     Absolute Neutrophil 10.7 (*)     Absolute Monocytes 2.0 (*)     Absolute Eosinophils 0.9 (*)     All other components within normal limits   COMPREHENSIVE METABOLIC PANEL - Abnormal; Notable for the following:     Urea Nitrogen 5 (*)     Albumin 3.2 (*)     ALT 68 (*)     AST 81 (*)     All other components within normal limits   URINE MACROSCOPIC WITH REFLEX TO MICRO - Abnormal; Notable for the following:     pH Urine 8.5 (*)     Protein Albumin Urine 10 (*)     Leukocyte Esterase Urine Moderate (*)     WBC Urine 17 (*)     Bacteria Urine Few (*)     Transitional Epi 2 (*)     Mucous Urine Present (*)     All " other components within normal limits       Assessments & Plan (with Medical Decision Making) patient was given fentanyl and Zofran along with IV fluids.  Labs were obtained.  White count was 16.2 there was a left shift noted.  Incontinence metabolic panel was slightly elevated ALT and AST.  Patient is not having upper abdominal pain at this time.  Due to his symptoms a CT scan of the abdomen and pelvis was obtained with H2O as oral contrast.  CT scan revealed a mildly thickened appendix measuring up to 1 cm in diameter with minimal periappendiceal fat stranding.  This could be consistent with mild early appendicitis.  Patient is still having significant right lower quadrant abdominal pain on reexamination and I therefore believe this is an early appendicitis.  I discussed the case with Dr. Fong who is on-call for general surgery.  He came in and evaluated the patient and will take this patient to surgery for further evaluation and care.  Patient had been covered with Flagyl and Cipro due to her cephalosporin allergy.  Findings and plan were discussed with the patient throughout her stay in the emergency department and she is in agreement with the plan.       I have reviewed the nursing notes.    I have reviewed the findings, diagnosis, plan and need for follow up with the patient.       New Prescriptions    No medications on file       Final diagnoses:   Appendicitis       8/26/2017   [facility] EMERGENCY DEPARTMENT     Benny Ibrahim MD  08/28/17 4007

## 2017-08-26 NOTE — ANESTHESIA PREPROCEDURE EVALUATION
Anesthesia Evaluation     . Pt has had prior anesthetic. Type: General and MAC           ROS/MED HX    ENT/Pulmonary:     (+)allergic rhinitis, tobacco use, Current use , . .    Neurologic:  - neg neurologic ROS     Cardiovascular:  - neg cardiovascular ROS       METS/Exercise Tolerance:     Hematologic:  - neg hematologic  ROS       Musculoskeletal:  - neg musculoskeletal ROS       GI/Hepatic:     (+) appendicitis, Other GI/Hepatic history of gastric bypass x2      Renal/Genitourinary:  - ROS Renal section negative       Endo:  - neg endo ROS       Psychiatric:     (+) psychiatric history anxiety      Infectious Disease:  - neg infectious disease ROS       Malignancy:      - no malignancy   Other:    - neg other ROS                 Physical Exam  Normal systems: cardiovascular, pulmonary and dental    Airway   Mallampati: II  TM distance: >3 FB  Neck ROM: full    Dental     Cardiovascular       Pulmonary                     Anesthesia Plan      History & Physical Review  History and physical reviewed and following examination; no interval change.    ASA Status:  2 emergent.    NPO Status:  > 8 hours and full stomach    Plan for General and ETT with Intravenous and Propofol induction. Maintenance will be Balanced.    PONV prophylaxis:  Ondansetron (or other 5HT-3) and Dexamethasone or Solumedrol  Additional equipment: Videolaryngoscope      Postoperative Care  Postoperative pain management:  IV analgesics and Oral pain medications.      Consents  Anesthetic plan, risks, benefits and alternatives discussed with:  Patient..                          .

## 2017-08-26 NOTE — IP AVS SNAPSHOT
MRN:[id number]                      After Visit Summary   8/26/2017    Ruth Morin    MRN: [id number]           Thank you!     Thank you for choosing [facility] for your care. Our goal is always to provide you with excellent care. Hearing back from our patients is one way we can continue to improve our services. Please take a few minutes to complete the written survey that you may receive in the mail after you visit with us. Thank you!        Patient Information     Date Of Birth          1965        Designated Caregiver       Most Recent Value    Caregiver    Will someone help with your care after discharge? no      About your hospital stay     You were admitted on:  August 26, 2017 You last received care in the:  [facility]    You were discharged on:  August 27, 2017       Who to Call     For medical emergencies, please call 911.  For non-urgent questions about your medical care, please call your primary care provider or clinic, [phone]  For questions related to your surgery, please call your surgery clinic        Attending Provider     Provider Specialty    Benny Ibrahim MD Emergency Medicine    Lazaro Fong MD Surgery       Primary Care Provider Office Phone # Fax #    Erica Almonte PA-C [phone]      Further instructions from your care team       Wash incisions daily with soap and water. Some mild redness or swelling is expected. If draining, cover with dry gauze.    No heavy lifting (less than 30 pounds) for 1 month.  Ok to return to work when pain allows.  Typically 1-2 weeks.    Okay to use ice pack over wound as necessary for comfort.    Use pain medication as necessary but avoid constipating side effects. Ibuprofen okay but avoid Tylenol as your pain medication already contains this.    Diet as tolerated. No restrictions.    Follow up with Dr Fong in 1-2 weeks.    Most people take the rest of the week off and return to work  "the following Monday. You may return sooner as pain allows. During your follow-up appointment, Dr. Fong will give you a formal letter for your work with any restrictions detailed.  All disability or other such paperwork will be addressed at that time.                  Pending Results     No orders found for last 3 day(s).            Statement of Approval     Ordered          17 1136  I have reviewed and agree with all the recommendations and orders detailed in this document.  EFFECTIVE NOW     Approved and electronically signed by:  Lazaro Fong MD             Admission Information     Date & Time Provider Department Dept. Phone    2017 Lazaro Fong MD [facility] [phone]      Your Vitals Were     Blood Pressure Pulse Temperature Respirations Height Weight    110/59 73 96.4  F (35.8  C) (Oral) 16 1.6 m (5' 3\") 86.2 kg (190 lb)    Pulse Oximetry BMI (Body Mass Index)                94% 33.66 kg/m2          MyChart Information     [facility] lets you send messages to your doctor, view your test results, renew your prescriptions, schedule appointments and more. To sign up, go to [email or web address] . Click on \"Log in\" on the left side of the screen, which will take you to the Welcome page. Then click on \"Sign up Now\" on the right side of the page.     You will be asked to enter the access code listed below, as well as some personal information. Please follow the directions to create your username and password.     Your access code is: [email or web address]  Expires: 2017 11:53 AM     Your access code will  in 90 days. If you need help or a new code, please call your [facility] clinic or [phone].        Care EveryWhere ID     This is your Care EveryWhere ID. This could be used by other organizations to access your [facility] medical records  [id number]        Equal Access to Services     BARBRA BENITEZ AH: Sheyla Liz, daysi sanders, marleny ziegler " milagro gonzalezumesh crowaan ah. So [facility] [phone].    ATENCIÓN: Si froy ortega, tiene a palafox disposición servicios gratuitos de asistencia lingüística. Joe al [phone].    We comply with applicable federal civil rights laws and [place] laws. We do not discriminate on the basis of race, color, national origin, age, disability sex, sexual orientation or gender identity.               Review of your medicines      START taking        Dose / Directions    oxyCODONE-acetaminophen 5-325 MG per tablet   Commonly known as:  PERCOCET        Dose:  1 tablet   Take 1 tablet by mouth every 4 hours as needed   Quantity:  30 tablet   Refills:  0         CONTINUE these medicines which have NOT CHANGED        Dose / Directions    chlorhexidine 0.12 % solution   Commonly known as:  PERIDEX        Dose:  15 mL   Swish and spit 15 mLs in mouth 2 times daily   Refills:  0       clobetasol 0.05 % external solution   Commonly known as:  TEMOVATE        Apply topically 2 times daily   Refills:  0       cromolyn 4 % ophthalmic solution   Commonly known as:  OPTICROM        Dose:  2 drop   Place 2 drops into both eyes 4 times daily   Refills:  0       fluticasone 50 MCG/ACT spray   Commonly known as:  FLONASE        Dose:  1 spray   Spray 1 spray into both nostrils daily   Refills:  0       multivitamin per tablet        Dose:  1 tablet   Take 1 tablet by mouth daily.   Quantity:  100 tablet   Refills:  12       triamcinolone 0.1 % lotion   Commonly known as:  KENALOG        Apply topically 2 times daily   Refills:  0            Where to get your medicines      Some of these will need a paper prescription and others can be bought over the counter. Ask your nurse if you have questions.     Bring a paper prescription for each of these medications     oxyCODONE-acetaminophen 5-325 MG per tablet                Protect others around you: Learn how to safely use, store and throw away your medicines at  www.disposemymeds.org.             Medication List: This is a list of all your medications and when to take them. Check marks below indicate your daily home schedule. Keep this list as a reference.      Medications           Morning Afternoon Evening Bedtime As Needed    chlorhexidine 0.12 % solution   Commonly known as:  PERIDEX   Swish and spit 15 mLs in mouth 2 times daily                                clobetasol 0.05 % external solution   Commonly known as:  TEMOVATE   Apply topically 2 times daily                                cromolyn 4 % ophthalmic solution   Commonly known as:  OPTICROM   Place 2 drops into both eyes 4 times daily                                fluticasone 50 MCG/ACT spray   Commonly known as:  FLONASE   Spray 1 spray into both nostrils daily                                multivitamin per tablet   Take 1 tablet by mouth daily.                                oxyCODONE-acetaminophen 5-325 MG per tablet   Commonly known as:  PERCOCET   Take 1 tablet by mouth every 4 hours as needed   Last time this was given:  1 tablet on 8/27/2017  6:16 AM                                triamcinolone 0.1 % lotion   Commonly known as:  KENALOG   Apply topically 2 times daily

## 2017-08-27 VITALS
HEART RATE: 73 BPM | DIASTOLIC BLOOD PRESSURE: 59 MMHG | OXYGEN SATURATION: 94 % | SYSTOLIC BLOOD PRESSURE: 110 MMHG | RESPIRATION RATE: 16 BRPM | TEMPERATURE: 96.4 F | BODY MASS INDEX: 33.66 KG/M2 | HEIGHT: 63 IN | WEIGHT: 190 LBS

## 2017-08-27 PROCEDURE — G0378 HOSPITAL OBSERVATION PER HR: HCPCS

## 2017-08-27 PROCEDURE — 25000132 ZZH RX MED GY IP 250 OP 250 PS 637: Mod: GY | Performed by: SURGERY

## 2017-08-27 PROCEDURE — 25000128 H RX IP 250 OP 636: Performed by: SURGERY

## 2017-08-27 PROCEDURE — A9270 NON-COVERED ITEM OR SERVICE: HCPCS | Mod: GY | Performed by: SURGERY

## 2017-08-27 RX ORDER — OXYCODONE AND ACETAMINOPHEN 5; 325 MG/1; MG/1
1 TABLET ORAL EVERY 4 HOURS PRN
Qty: 30 TABLET | Refills: 0 | Status: SHIPPED | OUTPATIENT
Start: 2017-08-27 | End: 2021-05-24

## 2017-08-27 RX ADMIN — METOCLOPRAMIDE 10 MG: 5 INJECTION, SOLUTION INTRAMUSCULAR; INTRAVENOUS at 06:06

## 2017-08-27 RX ADMIN — OXYCODONE HYDROCHLORIDE AND ACETAMINOPHEN 1 TABLET: 5; 325 TABLET ORAL at 06:16

## 2017-08-27 NOTE — PHARMACY - DISCHARGE MEDICATION RECONCILIATION
Discharge medication review for this patient is complete. Pharmacist assisted with medication reconciliation of discharge medications with prior to admission medications.     The following changes were made to the discharge medication list based on pharmacist review:  Added:  percocet  Discontinued: NA  Changed: NA      Patient's Discharge Medication List  - medications as listed on After Visit Summary (AVS)     Review of your medicines      START taking       Dose / Directions    oxyCODONE-acetaminophen 5-325 MG per tablet   Commonly known as:  PERCOCET        Dose:  1 tablet   Take 1 tablet by mouth every 4 hours as needed   Quantity:  30 tablet   Refills:  0         CONTINUE these medicines which have NOT CHANGED       Dose / Directions    chlorhexidine 0.12 % solution   Commonly known as:  PERIDEX        Dose:  15 mL   Swish and spit 15 mLs in mouth 2 times daily   Refills:  0       clobetasol 0.05 % external solution   Commonly known as:  TEMOVATE        Apply topically 2 times daily   Refills:  0       cromolyn 4 % ophthalmic solution   Commonly known as:  OPTICROM        Dose:  2 drop   Place 2 drops into both eyes 4 times daily   Refills:  0       fluticasone 50 MCG/ACT spray   Commonly known as:  FLONASE        Dose:  1 spray   Spray 1 spray into both nostrils daily   Refills:  0       multivitamin per tablet        Dose:  1 tablet   Take 1 tablet by mouth daily.   Quantity:  100 tablet   Refills:  12       triamcinolone 0.1 % lotion   Commonly known as:  KENALOG        Apply topically 2 times daily   Refills:  0            Where to get your medicines      Some of these will need a paper prescription and others can be bought over the counter. Ask your nurse if you have questions.     Bring a paper prescription for each of these medications      oxyCODONE-acetaminophen 5-325 MG per tablet

## 2017-08-27 NOTE — DISCHARGE INSTRUCTIONS
Wash incisions daily with soap and water. Some mild redness or swelling is expected. If draining, cover with dry gauze.    No heavy lifting (less than 30 pounds) for 1 month.  Ok to return to work when pain allows.  Typically 1-2 weeks.    Okay to use ice pack over wound as necessary for comfort.    Use pain medication as necessary but avoid constipating side effects. Ibuprofen okay but avoid Tylenol as your pain medication already contains this.    Diet as tolerated. No restrictions.    Follow up with Dr Fong in 1-2 weeks.    Most people take the rest of the week off and return to work the following Monday. You may return sooner as pain allows. During your follow-up appointment, Dr. Fong will give you a formal letter for your work with any restrictions detailed.  All disability or other such paperwork will be addressed at that time.

## 2017-08-27 NOTE — PLAN OF CARE
Problem: Goal Outcome Summary  Goal: Goal Outcome Summary  Outcome: Adequate for Discharge Date Met:  08/27/17  DANIELLA LACKEY DISCHARGE NOTE     Patient discharged to home at 12:50 PM via wheel chair. Accompanied by friend and staff. Discharge instructions reviewed with patient, opportunity offered to ask questions. Prescriptions filled and sent with patient upon discharge. All belongings sent with patient.     Tea Palacio

## 2017-08-27 NOTE — DISCHARGE SUMMARY
Physician Discharge Summary     Patient ID:  Ruth Morin  [id number]  52 year old  1965    Admit date: 8/26/2017    Discharge date and time: 8/27/2017     Admitting Physician: Lazaro Fong MD     Discharge Physician: Ajit GARCIA    Admission Diagnoses: Appendicitis [K37]  S/P laparoscopic appendectomy [Z90.49]    Discharge Diagnoses: appendicitis    Admission Condition: fair    Discharged Condition: good    Indication for Admission: RLQ abd pain    Hospital Course: Admitted to OR for lap appy.  Kept overnight for observation and discharged the following day.    Consults: none    Significant Diagnostic Studies: none    Treatments: surgery: lap appy    Discharge Exam:  See daily progress notes    Disposition: home    Patient Instructions:   Current Discharge Medication List      START taking these medications    Details   oxyCODONE-acetaminophen (PERCOCET) 5-325 MG per tablet Take 1 tablet by mouth every 4 hours as needed  Qty: 30 tablet, Refills: 0    Associated Diagnoses: S/P laparoscopic appendectomy         CONTINUE these medications which have NOT CHANGED    Details   clobetasol (TEMOVATE) 0.05 % external solution Apply topically 2 times daily      cromolyn (OPTICROM) 4 % ophthalmic solution Place 2 drops into both eyes 4 times daily      triamcinolone (KENALOG) 0.1 % lotion Apply topically 2 times daily      chlorhexidine (PERIDEX) 0.12 % solution Swish and spit 15 mLs in mouth 2 times daily      fluticasone (FLONASE) 50 MCG/ACT spray Spray 1 spray into both nostrils daily      Multiple Vitamin (MULTIVITAMIN) per tablet Take 1 tablet by mouth daily.  Qty: 100 tablet, Refills: 12           Activity: no heavy lifting for 4 weeks  Diet: regular diet  Wound Care: keep wound clean and dry    Follow-up with Dr Fong in 1 week.    Signed:  Lazaro Fong  8/27/2017  11:36 AM

## 2017-08-27 NOTE — ANESTHESIA POSTPROCEDURE EVALUATION
Patient: Ruth Morin    Procedure(s):   - Wound Class: III-Contaminated    Diagnosis:appendicitis  Diagnosis Additional Information: No value filed.    Anesthesia Type:  General, ETT    Note:  Anesthesia Post Evaluation    Patient location during evaluation: Bedside  Patient participation: Able to fully participate in evaluation  Level of consciousness: awake and alert  Pain management: adequate  Airway patency: patent  Cardiovascular status: acceptable  Respiratory status: acceptable  Hydration status: stable  PONV: none     Anesthetic complications: None          Last vitals:  Vitals:    08/26/17 1837 08/26/17 1902 08/26/17 1936   BP:  129/84 113/68   Pulse:  79 75   Resp: 16 16 18   Temp: 35.9  C (96.7  F)  36.1  C (97  F)   SpO2:  96% 94%         Electronically Signed By: BALDEMAR Mullen CRNA  August 26, 2017  8:18 PM

## 2017-08-27 NOTE — PLAN OF CARE
Problem: Goal Outcome Summary  Goal: Goal Outcome Summary  Outcome: Improving  Pt incisions CDI. Pain controlled with 1-2 tabs Norco and ice to abdomen. Denies N/V. Pt receiving scheduled Reglan.  Up with SBA. Drinking and voiding in good amounts. Saline locked this AM. VSS. Afebrile.

## 2017-08-27 NOTE — PROGRESS NOTES
"POD#1    Feeling better. Tolerating diet. Pain controlled.    I/O last 3 completed shifts:  In: 800 [I.V.:800]  Out: -     Patient Vitals for the past 24 hrs:   BP Temp Temp src Pulse Resp SpO2 Height Weight   08/27/17 0820 110/59 96.4  F (35.8  C) Oral 73 16 94 % - -   08/26/17 2307 113/68 97  F (36.1  C) Oral 80 20 95 % - -   08/26/17 1936 113/68 97  F (36.1  C) Oral 75 18 94 % - -   08/26/17 1902 129/84 - - 79 16 96 % - -   08/26/17 1837 - 96.7  F (35.9  C) Oral - 16 - - -   08/26/17 1806 109/79 - - - 16 94 % - -   08/26/17 1748 - - - - - 93 % - -   08/26/17 1745 117/75 - - - 16 - - -   08/26/17 1730 128/77 - - - 18 94 % - -   08/26/17 1715 121/75 - - - 20 97 % - -   08/26/17 1710 92/63 98.4  F (36.9  C) Oral - - - - -   08/26/17 1600 118/66 - - 82 18 97 % - -   08/26/17 1415 116/89 - - - - 96 % - -   08/26/17 1400 114/86 - - - - 96 % - -   08/26/17 1345 122/80 - - - - 90 % - -   08/26/17 1330 103/84 - - - - 95 % - -   08/26/17 1315 134/87 98  F (36.7  C) Oral 92 20 94 % 1.6 m (5' 3\") 86.2 kg (190 lb)         Exam:  AXO3 NAD  Neuro - Strength 5/5 all major groups, sensation intact, PERRL  Lungs - CTA  CV - RRR  Abd - Soft, non-distended, mild incisional tenderness, wounds clean, dry, and intact with no erythema. , +BS  Extr - No edema      A/P: s/p lap appy healing well.  Home today.    Lazaro Fong MD     "

## 2017-08-29 LAB — COPATH REPORT: NORMAL

## 2017-09-06 ENCOUNTER — TELEPHONE (OUTPATIENT)
Dept: SURGERY | Facility: CLINIC | Age: 52
End: 2017-09-06

## 2017-09-06 NOTE — TELEPHONE ENCOUNTER
Reason for Call:  Other call back    Detailed comments: pt calling stating she has appendix taken out on 8/26, she wants to know why the incision is on the left side when her appendix is on the right side. She wants to know if her appendix was even removed     Phone Number Patient can be reached at: Home number on file [phone] (home)    Best Time: any     Can we leave a detailed message on this number? YES    Call taken on 9/6/2017 at 11:59 AM by Mari Gallagher

## 2017-09-06 NOTE — TELEPHONE ENCOUNTER
Listed number indicates it has been disconnected.  Unable to call.    Pathology indicates it was her appendix and it was determined a better surgical practice years ago to approach from the other side for best visualization and route to get to the appendix.    Jackie Vera RN  [facility]

## 2017-11-03 RX ORDER — IOPAMIDOL 755 MG/ML
93 INJECTION, SOLUTION INTRAVASCULAR ONCE
Status: COMPLETED | OUTPATIENT
Start: 2017-11-06 | End: 2017-11-06

## 2017-11-06 ENCOUNTER — HOSPITAL ENCOUNTER (OUTPATIENT)
Dept: CT IMAGING | Facility: CLINIC | Age: 52
Discharge: HOME | End: 2017-11-06
Attending: FAMILY MEDICINE | Admitting: FAMILY MEDICINE
Payer: MEDICARE

## 2017-11-06 DIAGNOSIS — K52.9 CHRONIC DIARRHEA: ICD-10-CM

## 2017-11-06 DIAGNOSIS — Z90.49 STATUS POST LAPAROSCOPIC APPENDECTOMY: ICD-10-CM

## 2017-11-06 DIAGNOSIS — R10.84 ABDOMINAL PAIN, GENERALIZED: ICD-10-CM

## 2017-11-06 PROCEDURE — 74177 CT ABD & PELVIS W/CONTRAST: CPT

## 2017-11-06 PROCEDURE — 25000128 H RX IP 250 OP 636: Performed by: RADIOLOGY

## 2017-11-06 PROCEDURE — 25000125 ZZHC RX 250: Performed by: RADIOLOGY

## 2017-11-06 RX ADMIN — SODIUM CHLORIDE 63 ML: 9 INJECTION, SOLUTION INTRAVENOUS at 13:34

## 2017-11-06 RX ADMIN — IOPAMIDOL 93 ML: 755 INJECTION, SOLUTION INTRAVENOUS at 13:35

## 2020-08-15 ENCOUNTER — APPOINTMENT (OUTPATIENT)
Dept: CT IMAGING | Facility: CLINIC | Age: 55
End: 2020-08-15
Attending: EMERGENCY MEDICINE
Payer: COMMERCIAL

## 2020-08-15 ENCOUNTER — HOSPITAL ENCOUNTER (EMERGENCY)
Facility: CLINIC | Age: 55
Discharge: ANOTHER HOSPITAL | End: 2020-08-15
Attending: EMERGENCY MEDICINE | Admitting: EMERGENCY MEDICINE
Payer: COMMERCIAL

## 2020-08-15 VITALS
RESPIRATION RATE: 15 BRPM | HEART RATE: 94 BPM | DIASTOLIC BLOOD PRESSURE: 90 MMHG | HEIGHT: 63 IN | WEIGHT: 160 LBS | TEMPERATURE: 99.3 F | OXYGEN SATURATION: 94 % | BODY MASS INDEX: 28.35 KG/M2 | SYSTOLIC BLOOD PRESSURE: 143 MMHG

## 2020-08-15 DIAGNOSIS — D69.6 THROMBOCYTOPENIA (H): ICD-10-CM

## 2020-08-15 DIAGNOSIS — K92.2 GASTROINTESTINAL HEMORRHAGE, UNSPECIFIED GASTROINTESTINAL HEMORRHAGE TYPE: ICD-10-CM

## 2020-08-15 DIAGNOSIS — R79.1 ELEVATED INR: ICD-10-CM

## 2020-08-15 DIAGNOSIS — K76.89 LIVER NODULE: ICD-10-CM

## 2020-08-15 LAB
ALBUMIN SERPL-MCNC: 3.5 G/DL (ref 3.4–5)
ALP SERPL-CCNC: 175 U/L (ref 40–150)
ALT SERPL W P-5'-P-CCNC: 59 U/L (ref 0–50)
ANION GAP SERPL CALCULATED.3IONS-SCNC: 10 MMOL/L (ref 3–14)
AST SERPL W P-5'-P-CCNC: 220 U/L (ref 0–45)
BASOPHILS # BLD AUTO: 0.1 10E9/L (ref 0–0.2)
BASOPHILS NFR BLD AUTO: 0.8 %
BILIRUB SERPL-MCNC: 2.4 MG/DL (ref 0.2–1.3)
BUN SERPL-MCNC: 4 MG/DL (ref 7–30)
CALCIUM SERPL-MCNC: 8.6 MG/DL (ref 8.5–10.1)
CHLORIDE SERPL-SCNC: 105 MMOL/L (ref 94–109)
CO2 SERPL-SCNC: 24 MMOL/L (ref 20–32)
CREAT SERPL-MCNC: 0.35 MG/DL (ref 0.52–1.04)
DIFFERENTIAL METHOD BLD: ABNORMAL
EOSINOPHIL # BLD AUTO: 0 10E9/L (ref 0–0.7)
EOSINOPHIL NFR BLD AUTO: 0.2 %
ERYTHROCYTE [DISTWIDTH] IN BLOOD BY AUTOMATED COUNT: 14.3 % (ref 10–15)
GFR SERPL CREATININE-BSD FRML MDRD: >90 ML/MIN/{1.73_M2}
GLUCOSE SERPL-MCNC: 129 MG/DL (ref 70–99)
HCT VFR BLD AUTO: 34.6 % (ref 35–47)
HEMOCCULT STL QL: POSITIVE
HGB BLD-MCNC: 11.5 G/DL (ref 11.7–15.7)
IMM GRANULOCYTES # BLD: 0 10E9/L (ref 0–0.4)
IMM GRANULOCYTES NFR BLD: 0.3 %
INR PPP: 1.48 (ref 0.86–1.14)
INTERNAL QC OK POCT: YES
LIPASE SERPL-CCNC: 81 U/L (ref 73–393)
LYMPHOCYTES # BLD AUTO: 0.3 10E9/L (ref 0.8–5.3)
LYMPHOCYTES NFR BLD AUTO: 4.9 %
MCH RBC QN AUTO: 31 PG (ref 26.5–33)
MCHC RBC AUTO-ENTMCNC: 33.2 G/DL (ref 31.5–36.5)
MCV RBC AUTO: 93 FL (ref 78–100)
MONOCYTES # BLD AUTO: 0.6 10E9/L (ref 0–1.3)
MONOCYTES NFR BLD AUTO: 9.1 %
NEUTROPHILS # BLD AUTO: 5.5 10E9/L (ref 1.6–8.3)
NEUTROPHILS NFR BLD AUTO: 84.7 %
NRBC # BLD AUTO: 0 10*3/UL
NRBC BLD AUTO-RTO: 0 /100
PLATELET # BLD AUTO: 26 10E9/L (ref 150–450)
PLATELET # BLD EST: ABNORMAL 10*3/UL
POTASSIUM SERPL-SCNC: 3.3 MMOL/L (ref 3.4–5.3)
PROT SERPL-MCNC: 8.8 G/DL (ref 6.8–8.8)
RBC # BLD AUTO: 3.71 10E12/L (ref 3.8–5.2)
RBC MORPH BLD: NORMAL
SODIUM SERPL-SCNC: 139 MMOL/L (ref 133–144)
TEST CARD LOT NUMBER: NORMAL
WBC # BLD AUTO: 6.5 10E9/L (ref 4–11)

## 2020-08-15 PROCEDURE — 74177 CT ABD & PELVIS W/CONTRAST: CPT

## 2020-08-15 PROCEDURE — 25000128 H RX IP 250 OP 636: Performed by: EMERGENCY MEDICINE

## 2020-08-15 PROCEDURE — C9803 HOPD COVID-19 SPEC COLLECT: HCPCS | Performed by: EMERGENCY MEDICINE

## 2020-08-15 PROCEDURE — 96376 TX/PRO/DX INJ SAME DRUG ADON: CPT | Performed by: EMERGENCY MEDICINE

## 2020-08-15 PROCEDURE — 25800030 ZZH RX IP 258 OP 636: Performed by: EMERGENCY MEDICINE

## 2020-08-15 PROCEDURE — 82272 OCCULT BLD FECES 1-3 TESTS: CPT | Performed by: EMERGENCY MEDICINE

## 2020-08-15 PROCEDURE — 96375 TX/PRO/DX INJ NEW DRUG ADDON: CPT | Mod: 59 | Performed by: EMERGENCY MEDICINE

## 2020-08-15 PROCEDURE — 85610 PROTHROMBIN TIME: CPT | Performed by: EMERGENCY MEDICINE

## 2020-08-15 PROCEDURE — 93010 ELECTROCARDIOGRAM REPORT: CPT | Mod: Z6 | Performed by: EMERGENCY MEDICINE

## 2020-08-15 PROCEDURE — 99285 EMERGENCY DEPT VISIT HI MDM: CPT | Mod: 25 | Performed by: EMERGENCY MEDICINE

## 2020-08-15 PROCEDURE — C9113 INJ PANTOPRAZOLE SODIUM, VIA: HCPCS | Performed by: EMERGENCY MEDICINE

## 2020-08-15 PROCEDURE — 83690 ASSAY OF LIPASE: CPT | Performed by: EMERGENCY MEDICINE

## 2020-08-15 PROCEDURE — 93005 ELECTROCARDIOGRAM TRACING: CPT | Performed by: EMERGENCY MEDICINE

## 2020-08-15 PROCEDURE — 96365 THER/PROPH/DIAG IV INF INIT: CPT | Mod: 59 | Performed by: EMERGENCY MEDICINE

## 2020-08-15 PROCEDURE — 99291 CRITICAL CARE FIRST HOUR: CPT | Mod: 25 | Performed by: EMERGENCY MEDICINE

## 2020-08-15 PROCEDURE — 85025 COMPLETE CBC W/AUTO DIFF WBC: CPT | Performed by: EMERGENCY MEDICINE

## 2020-08-15 PROCEDURE — 80053 COMPREHEN METABOLIC PANEL: CPT | Performed by: EMERGENCY MEDICINE

## 2020-08-15 RX ORDER — FENTANYL CITRATE 50 UG/ML
0.5 INJECTION, SOLUTION INTRAMUSCULAR; INTRAVENOUS ONCE
Status: COMPLETED | OUTPATIENT
Start: 2020-08-15 | End: 2020-08-15

## 2020-08-15 RX ORDER — ONDANSETRON 2 MG/ML
4 INJECTION INTRAMUSCULAR; INTRAVENOUS EVERY 30 MIN PRN
Status: DISCONTINUED | OUTPATIENT
Start: 2020-08-15 | End: 2020-08-16 | Stop reason: HOSPADM

## 2020-08-15 RX ORDER — OCTREOTIDE ACETATE 50 UG/ML
50 INJECTION, SOLUTION INTRAVENOUS; SUBCUTANEOUS ONCE
Status: DISCONTINUED | OUTPATIENT
Start: 2020-08-15 | End: 2020-08-15

## 2020-08-15 RX ORDER — OCTREOTIDE ACETATE 50 UG/ML
50 INJECTION, SOLUTION INTRAVENOUS; SUBCUTANEOUS ONCE
Status: DISCONTINUED | OUTPATIENT
Start: 2020-08-15 | End: 2020-08-15 | Stop reason: DRUGHIGH

## 2020-08-15 RX ORDER — IOPAMIDOL 755 MG/ML
79 INJECTION, SOLUTION INTRAVASCULAR ONCE
Status: COMPLETED | OUTPATIENT
Start: 2020-08-15 | End: 2020-08-15

## 2020-08-15 RX ORDER — OCTREOTIDE ACETATE 100 UG/ML
50 INJECTION, SOLUTION INTRAVENOUS; SUBCUTANEOUS ONCE
Status: COMPLETED | OUTPATIENT
Start: 2020-08-15 | End: 2020-08-15

## 2020-08-15 RX ADMIN — OCTREOTIDE ACETATE 50 MCG: 100 INJECTION, SOLUTION INTRAVENOUS; SUBCUTANEOUS at 22:47

## 2020-08-15 RX ADMIN — ONDANSETRON 4 MG: 2 INJECTION INTRAMUSCULAR; INTRAVENOUS at 22:46

## 2020-08-15 RX ADMIN — FENTANYL CITRATE 36.5 MCG: 50 INJECTION, SOLUTION INTRAMUSCULAR; INTRAVENOUS at 23:40

## 2020-08-15 RX ADMIN — IOPAMIDOL 79 ML: 755 INJECTION, SOLUTION INTRAVENOUS at 19:39

## 2020-08-15 RX ADMIN — PANTOPRAZOLE SODIUM 8 MG/HR: 40 INJECTION, POWDER, LYOPHILIZED, FOR SOLUTION INTRAVENOUS at 22:46

## 2020-08-15 RX ADMIN — ONDANSETRON 4 MG: 2 INJECTION INTRAMUSCULAR; INTRAVENOUS at 18:09

## 2020-08-15 RX ADMIN — PANTOPRAZOLE SODIUM 40 MG: 40 INJECTION, POWDER, FOR SOLUTION INTRAVENOUS at 18:52

## 2020-08-15 ASSESSMENT — ENCOUNTER SYMPTOMS
VOMITING: 1
COLOR CHANGE: 0
COUGH: 1
NAUSEA: 1
EYE REDNESS: 0
FREQUENCY: 0
EYE PAIN: 0
ABDOMINAL PAIN: 1
FEVER: 0
LIGHT-HEADEDNESS: 0
SHORTNESS OF BREATH: 1
AGITATION: 1
NECK STIFFNESS: 0
DIARRHEA: 1
CHILLS: 1
SORE THROAT: 0
NECK PAIN: 0
DYSURIA: 0
HEADACHES: 0

## 2020-08-15 ASSESSMENT — MIFFLIN-ST. JEOR: SCORE: 1294.89

## 2020-08-15 NOTE — ED PROVIDER NOTES
"  History     Chief Complaint   Patient presents with     Hemoptysis     coughing up blood today.  denies blood thinners. h/o gstric bypass \"I've been dumping a lot today\".  feels fever and chilled     HPI  Ruth Morin is a 55 year old female with history of generalized abdominal pain, allergic rhinitis, anxiety, gastric bypass, who one day of coughing up blood, vomiting blood, dark stools, dumping 2/2 gastric bypass, cough, chills, and abdominal pain.  She says her symptoms began yesterday evening.  Has coughed a few times and then spits up blood in the sink.  States it happened twice yesterday and 3 times today.  She is unable to quantify how much blood she spat out.  Also today is had several episodes of emesis some with blood.  Upon questioning she does say that she has had dark-colored stools.  She has not measured her temperature but has felt \"hot and cold\" beginning last evening.    The patient's PMHx, Surgical Hx, Allergies, and Medications were all reviewed with the patient.      Anxiety state, unspecified F41.1     Backache, unspecified M54.9     Pernicious anemia D51.0     Alcohol abuse, in remission F10.11     Abdominal pannus E65     Incisional hernias with abdominal wall and peritoneal adhesions K43.2     Spondylolisthesis at L5-S1 level M43.17     Healthcare maintenance Z00.00     Generalized anxiety disorder F41.1     Adjustment disorder F43.20     Left-sided sensorineural hearing loss H90.5     Incisional hernia, without obstruction or gangrene K43.2     Encounter for screening colonoscopy Z12.11     PMH:   Past Medical History:   . Date     Abdominal pain     Adjustment disorder 2015     Alcohol abuse, in remission 2012     Anxiety state, unspecified 6/27/2007     ASCUS of cervix with negative high risk HPV 08/04/2008, 03/18/2010   ASCUS/HPV negative Plan: Routine screening     BACK PAIN 6/27/2007     Chronic pain syndrome   Sully Dejesus MSW [facility] [address], [address], [place] [address]; Phone [phone]; Fax [phone]; [email or web address]     Depression     Diffuse cystic mastopathy     DJD (degenerative joint disease), multiple sites 2006   [place] [facility] low back and neck care, [facility] , [address], [facility] [address], [phone] Dr. David Armando;     Hearing loss     Incisional hernia     Left-sided sensorineural hearing loss 2016     Obesity     Other and unspecified ovarian cyst 3/13/2007     Postcoital bleeding 3/13/2007     PTSD (post-traumatic stress disorder)     Spondylolisthesis at L5-S1 level 2014     Tobacco use disorder 6/27/2007     Urinary tract infection, site not specified 3/13/2007     Surgical History:  Past Surgical History:   . Laterality Date     APPENDECTOMY 2018     BREAST REDUCTION 2018     COLONOSCOPY 04/18/2019     DILATION AND CURETTAGE     GASTRIC BYPASS   x2; 1999 - [facility], 2002 - [facility] (stricture)     HYSTERECTOMY 2006   both ovaries remain, supracervical hysterectomy 12/2006 , [place]     OTHER   sinus surgery x 2     PELVIC LAPAROSCOPY     REPAIR MULTIPLE INCISIONAL HERNIAS, PANNICULECTOMY 8/7/2013     AFSANEH AND BSO     TONSIL AND ADENOIDECTOMY 1993     TUBAL LIGATION     Allergies:  Allergies   Allergen Reactions     Vicodin [Hydrocodone-Acetaminophen] Other (See Comments) and Rash     Uncontrollable shaking.  Per PT.     Acyclovir Other (See Comments), Hives and Rash     Cold sores     Augmentin Other (See Comments), Hives and Rash     Cold sores       Benadryl [Pharbedryl] Other (See Comments), Hives and Rash     Cold sores     Cephalosporins Other (See Comments), Hives and Rash     Cold sores     Sulfa Drugs Other (See Comments), Hives and Rash     Cold sores       Problem List:    Patient Active Problem List    Diagnosis Date Noted     Liver nodule 08/15/2020     Priority: Medium     S/P laparoscopic appendectomy 08/26/2017     Priority: Medium     AR (allergic rhinitis) 01/31/2013      Priority: Medium     Chronic generalized abdominal pain 01/09/2013     Priority: Medium        Past Medical History:    Past Medical History:   Diagnosis Date     Anxiety state, unspecified      Back pain      Diffuse cystic mastopathy      Ovarian cyst      Postcoital bleeding      Tobacco use disorder      Unspecified sinusitis (chronic)      Urinary tract infection, site not specified        Past Surgical History:    Past Surgical History:   Procedure Laterality Date     ESOPHAGOSCOPY, GASTROSCOPY, DUODENOSCOPY (EGD), COMBINED  1/18/2013    Procedure: COMBINED ESOPHAGOSCOPY, GASTROSCOPY, DUODENOSCOPY (EGD), BIOPSY SINGLE OR MULTIPLE;  Gastroscopy;  Surgeon: Leann Alicea MD;  Location: [facility]     GASTRIC BYPASS      x 2     HERNIA REPAIR      with mesh     LAPAROSCOPIC APPENDECTOMY N/A 8/26/2017    Procedure: LAPAROSCOPIC APPENDECTOMY;;  Surgeon: Lazaro Fong MD;  Location: [facility]     MAMMOPLASTY REDUCTION BILATERAL       TUBAL LIGATION         Family History:    No family history on file.    Social History:  Marital Status:  Single [1]  Social History     Tobacco Use     Smoking status: Current Every Day Smoker     Smokeless tobacco: Never Used     Tobacco comment: 1-2 cigarettes   Substance Use Topics     Alcohol use: Yes     Comment: social     Drug use: No        Medications:    chlorhexidine (PERIDEX) 0.12 % solution  clobetasol (TEMOVATE) 0.05 % external solution  cromolyn (OPTICROM) 4 % ophthalmic solution  fluticasone (FLONASE) 50 MCG/ACT spray  Multiple Vitamin (MULTIVITAMIN) per tablet  oxyCODONE-acetaminophen (PERCOCET) 5-325 MG per tablet  triamcinolone (KENALOG) 0.1 % lotion          Review of Systems   Constitutional: Positive for chills. Negative for fever.   HENT: Negative for sore throat.    Eyes: Negative for pain and redness.   Respiratory: Positive for cough and shortness of breath.    Cardiovascular: Negative for chest pain and leg swelling.   Gastrointestinal: Positive for  "abdominal pain, diarrhea, nausea and vomiting.   Genitourinary: Negative for dysuria, frequency and urgency.   Musculoskeletal: Negative for neck pain and neck stiffness.   Skin: Negative for color change and rash.   Neurological: Negative for light-headedness and headaches.   Psychiatric/Behavioral: Positive for agitation.       Physical Exam   BP: (!) 155/92  Pulse: 113  Heart Rate: 105  Temp: 99.3  F (37.4  C)  Resp: 18  Height: 160 cm (5' 3\")  Weight: 72.6 kg (160 lb)  SpO2: 96 %    Physical Exam  GEN: Awake, alert, and cooperative. Appears distressed.  HENT: MMM. External ears and nose normal bilaterally. Atraumatic.   EYES: EOM intact. Conjunctiva clear. No discharge.   NECK: Supple, symmetric. Non tender  CV : Regular rate and rhythm. Extremities warm and well perfused.   PULM: Normal effort. Clear lungs in anterior fields.   ABD: Soft, epigastric tenderness to palpation, non-distended. No rebound or guarding.   RECTAL: Guaiac positive stool. No gross blood. No masses palpated.  NEURO: Normal speech. Following commands. CN II-XII grossly intact. Answering questions and interacting appropriately.   EXT: No gross deformity.   INT: Warm. No diaphoresis. Normal color.        ED Course        Procedures             EKG Interpretation:      Interpreted by Dexter Fajardo MD  Time reviewed: 1800  Symptoms at time of EKG: hemoptysis   Rhythm: sinus tachycardia  Rate: 107  Axis: Left Axis Deviation  Ectopy: none  Conduction: normal  ST Segments/ T Waves: Poor R wave progression and T wave inversion III, V1, V2 and V3  Q Waves: none  Comparison to prior: No old EKG available    Clinical Impression: ABNORMAL ECG, non specific TWI               Critical Care time:  30 minutes excluding procedures                Results for orders placed or performed during the hospital encounter of 08/15/20 (from the past 24 hour(s))   CBC with platelets differential   Result Value Ref Range    WBC 6.5 4.0 - 11.0 10e9/L    RBC Count " 3.71 (L) 3.8 - 5.2 10e12/L    Hemoglobin 11.5 (L) 11.7 - 15.7 g/dL    Hematocrit 34.6 (L) 35.0 - 47.0 %    MCV 93 78 - 100 fl    MCH 31.0 26.5 - 33.0 pg    MCHC 33.2 31.5 - 36.5 g/dL    RDW 14.3 10.0 - 15.0 %    Platelet Count 26 (LL) 150 - 450 10e9/L    Diff Method Automated Method     % Neutrophils 84.7 %    % Lymphocytes 4.9 %    % Monocytes 9.1 %    % Eosinophils 0.2 %    % Basophils 0.8 %    % Immature Granulocytes 0.3 %    Nucleated RBCs 0 0 /100    Absolute Neutrophil 5.5 1.6 - 8.3 10e9/L    Absolute Lymphocytes 0.3 (L) 0.8 - 5.3 10e9/L    Absolute Monocytes 0.6 0.0 - 1.3 10e9/L    Absolute Eosinophils 0.0 0.0 - 0.7 10e9/L    Absolute Basophils 0.1 0.0 - 0.2 10e9/L    Abs Immature Granulocytes 0.0 0 - 0.4 10e9/L    Absolute Nucleated RBC 0.0     RBC Morphology Normal     Platelet Estimate Confirming automated cell count    Comprehensive metabolic panel   Result Value Ref Range    Sodium 139 133 - 144 mmol/L    Potassium 3.3 (L) 3.4 - 5.3 mmol/L    Chloride 105 94 - 109 mmol/L    Carbon Dioxide 24 20 - 32 mmol/L    Anion Gap 10 3 - 14 mmol/L    Glucose 129 (H) 70 - 99 mg/dL    Urea Nitrogen 4 (L) 7 - 30 mg/dL    Creatinine 0.35 (L) 0.52 - 1.04 mg/dL    GFR Estimate >90 >60 mL/min/[1.73_m2]    GFR Estimate If Black >90 >60 mL/min/[1.73_m2]    Calcium 8.6 8.5 - 10.1 mg/dL    Bilirubin Total 2.4 (H) 0.2 - 1.3 mg/dL    Albumin 3.5 3.4 - 5.0 g/dL    Protein Total 8.8 6.8 - 8.8 g/dL    Alkaline Phosphatase 175 (H) 40 - 150 U/L    ALT 59 (H) 0 - 50 U/L     (H) 0 - 45 U/L   INR   Result Value Ref Range    INR 1.48 (H) 0.86 - 1.14   Lipase   Result Value Ref Range    Lipase 81 73 - 393 U/L   CT Chest/Abdomen/Pelvis w Contrast    Narrative    EXAM: CT CHEST/ABDOMEN/PELVIS W CONTRAST  LOCATION: [facility]  DATE/TIME: 8/15/2020 7:35 PM    INDICATION: Hemoptysis, hematemesis, guaiac positive stool, cough, chills  COMPARISON: Abdomen 11/06/2017, no CT comparison for the chest  TECHNIQUE: CT scan of the  chest, abdomen, and pelvis was performed following injection of IV contrast. Multiplanar reformats were obtained. Dose reduction techniques were used.   CONTRAST: 79 mL Isovue 370    FINDINGS:   LUNGS AND PLEURA: Mild fibroatelectasis without focal infiltrate or effusion.    MEDIASTINUM/AXILLAE: Normal.    HEPATOBILIARY: Heterogeneous dense hepatic steatosis with nodularity throughout. Trace ascites. Varices.    PANCREAS: Normal.    SPLEEN: Enlarged, 15.4 cm in length.    ADRENAL GLANDS: Normal.    KIDNEYS/BLADDER: Tiny renal lesions too small to characterize definitively though likely benign cysts. Diffuse thickening of urinary bladder accentuated from incomplete distention.    BOWEL: Diverticulosis without diverticulitis. No bowel obstruction.    LYMPH NODES: Normal.    VASCULATURE: Multiple venous collaterals. No aortic aneurysm.    PELVIC ORGANS: Hysterectomy.    MUSCULOSKELETAL: Nonspecific inflammatory changes or scarring anterior abdominal wall in the subcutaneous fat overlying the abdomen and pelvis centered at the midline. Hernia repair intact. Degenerative disease. Bilateral spondylolysis without   significant spondylolisthesis at L5-S1 assuming 5 lumbar type vertebral bodies. Old left ninth rib fracture.      Impression    IMPRESSION:  1.  Hepatic cirrhosis. Innumerable nodules throughout the liver. MRI recommended as an outpatient to exclude underlying neoplasm.  2.  Diverticulosis without diverticulitis.  3.  Nonspecific inflammatory changes anterior abdominal subcutaneous fat extending to the anterior abdominal wall. Hernia repair intact.       Medications   ondansetron (ZOFRAN) injection 4 mg (4 mg Intravenous Given 8/15/20 2246)   pantoprazole (PROTONIX) 80 mg in sodium chloride 0.9 % 100 mL infusion (0 mg/hr Intravenous ED Infusing on Admission/transfer 8/15/20 2343)   pantoprazole (PROTONIX) 40 mg IV push injection (40 mg Intravenous Given 8/15/20 1932)   iopamidol (ISOVUE-370) solution 79 mL (79  mLs Intravenous Given 8/15/20 1939)   sodium chloride (PF) 0.9% PF flush  mL (100 mLs Intravenous Given 8/15/20 1939)   octreotide (sandoSTATIN) injection 50 mcg (50 mcg Intravenous Given 8/15/20 2247)   fentaNYL (PF) (SUBLIMAZE) injection 36.5 mcg (36.5 mcg Intravenous Given 8/15/20 2340)       Assessments & Plan (with Medical Decision Making)   55 year old female with past medical history of gastric bypass surgery, anxiety, mood disorder, anemia, alcohol abuse, who presents with multiple complaints including hemoptysis, nausea, vomiting, hematemesis, dark stools, chills and cough.  On arrival to Emergency Department, vital signs were blood pressure 155/92, temperature 99.3, heart rate 113, respiratory rate 18, SPO2 96% on room air.    On exam patient was quite anxious, lungs clear, mild epigastric tenderness.  Rectal exam with no masses palpated and guaiac positive stool. initial labs notable for hemoglobin of 11.5, platelets of 26, INR 1.48, total bilirubin of 2.4, AST/ALT of 220/59.  Review of records through care everywhere show recent labs on 8/5/2020 with hemoglobin of 11.8, platelets of 69, AST over ALT of 152/39.  Given concern for GI bleed she was given 40 mg of IV pantoprazole.    She is a difficult historian and I am not clear if she is really having hematemesis or hemoptysis.  Given her history of gastric surgeries, and guaiac positive stool I favor hematemesis.  CT of chest abdomen pelvis obtained and notable for hepatic cirrhosis and a liver nodules.  She has diverticulosis without diverticulitis.  Patient will need admission to hospital for further evaluation and management and likely upper endoscopy.  Given the concerns for hepatic disease and that was cytopenia I discussed the case with Dr. Crystal from general surgery who felt she would benefit from specialty care at a larger facility.  Discussed case with Dr. Swartz [facility] who was concerned about possible hepatocellular  carcinoma and agreed with transfer.  Initially planned on [facility] however no bed availability so went to [facility].  I then spoke with Dr. Ventura with hospital service at [facility] who accepted the transfer.  She recommended octreotide and pantoprazole infusion which were both initiated prior to transport.  I discussed the results and need for transfer with the patient.  She was in agreement with plan for admission and transfer.  She was given several doses of IV ondansetron for nausea and was given 0.5 mg/kg's of fentanyl for pain control.    I have reviewed the nursing notes.         Discharge Medication List as of 8/15/2020 11:51 PM          Final diagnoses:   Gastrointestinal hemorrhage, unspecified gastrointestinal hemorrhage type   Thrombocytopenia (H)   Liver nodule   Elevated INR     Dexter Fajardo MD    8/15/2020   [facility] EMERGENCY DEPARTMENT    Disclaimer: This note consists of words and symbols derived from keyboarding and dictation using voice recognition software.  As a result, there may be errors that have gone undetected.  Please consider this when interpreting information found in this note.             Dexter Fajardo MD  08/16/20 0105

## 2020-08-16 ENCOUNTER — SURGERY - HEALTHEAST (OUTPATIENT)
Dept: GASTROENTEROLOGY | Facility: CLINIC | Age: 55
End: 2020-08-16

## 2020-08-16 ENCOUNTER — RECORDS - HEALTHEAST (OUTPATIENT)
Dept: ADMINISTRATIVE | Facility: OTHER | Age: 55
End: 2020-08-16

## 2020-08-16 LAB
HGB BLD-MCNC: 9.9 G/DL (ref 12–16)
HGB BLD-MCNC: 9.9 G/DL (ref 12–16)
LABORATORY COMMENT REPORT: NORMAL
SARS-COV-2 RNA SPEC QL NAA+PROBE: NEGATIVE
SARS-COV-2 RNA SPEC QL NAA+PROBE: NORMAL
SPECIMEN SOURCE: NORMAL
SPECIMEN SOURCE: NORMAL

## 2020-08-16 ASSESSMENT — MIFFLIN-ST. JEOR
SCORE: 1280.81
SCORE: 1289.2

## 2020-08-16 NOTE — ED TRIAGE NOTES
Patient reports she started to experience productive cough (blood tinged per patient report) and vomiting bloody/dark emesis last evening. Patient has had gastric bypass x 2. Gastric emptying all day ~ 7 episodes of dark stools. Intermittently feeling hot and cold throughout day but has not actually taken her temperature.

## 2020-08-18 ENCOUNTER — COMMUNICATION - HEALTHEAST (OUTPATIENT)
Dept: SCHEDULING | Facility: CLINIC | Age: 55
End: 2020-08-18

## 2020-08-18 LAB — HGB BLD-MCNC: 9.4 G/DL (ref 12–16)

## 2020-08-19 ENCOUNTER — HOME CARE/HOSPICE - HEALTHEAST (OUTPATIENT)
Dept: HOME HEALTH SERVICES | Facility: HOME HEALTH | Age: 55
End: 2020-08-19

## 2020-08-20 LAB — HGB BLD-MCNC: 9.1 G/DL (ref 12–16)

## 2021-05-24 ENCOUNTER — HOSPITAL ENCOUNTER (EMERGENCY)
Facility: CLINIC | Age: 56
Discharge: ANOTHER HOSPITAL | End: 2021-05-24
Attending: FAMILY MEDICINE | Admitting: FAMILY MEDICINE
Payer: COMMERCIAL

## 2021-05-24 VITALS
BODY MASS INDEX: 28.34 KG/M2 | OXYGEN SATURATION: 93 % | TEMPERATURE: 97.6 F | SYSTOLIC BLOOD PRESSURE: 124 MMHG | RESPIRATION RATE: 13 BRPM | WEIGHT: 160 LBS | DIASTOLIC BLOOD PRESSURE: 82 MMHG | HEART RATE: 99 BPM

## 2021-05-24 DIAGNOSIS — I85.11 SECONDARY ESOPHAGEAL VARICES WITH BLEEDING (H): ICD-10-CM

## 2021-05-24 DIAGNOSIS — K70.30 ALCOHOLIC CIRRHOSIS OF LIVER WITHOUT ASCITES (H): ICD-10-CM

## 2021-05-24 DIAGNOSIS — K92.0 GASTROINTESTINAL HEMORRHAGE WITH HEMATEMESIS: ICD-10-CM

## 2021-05-24 LAB
ABO + RH BLD: NORMAL
ABO + RH BLD: NORMAL
ALBUMIN SERPL-MCNC: 3.4 G/DL (ref 3.4–5)
ALP SERPL-CCNC: 139 U/L (ref 40–150)
ALT SERPL W P-5'-P-CCNC: 78 U/L (ref 0–50)
ANION GAP SERPL CALCULATED.3IONS-SCNC: 11 MMOL/L (ref 3–14)
AST SERPL W P-5'-P-CCNC: 109 U/L (ref 0–45)
BASOPHILS # BLD AUTO: 0.1 10E9/L (ref 0–0.2)
BASOPHILS NFR BLD AUTO: 1.2 %
BILIRUB SERPL-MCNC: 1.5 MG/DL (ref 0.2–1.3)
BLD GP AB SCN SERPL QL: NORMAL
BLOOD BANK CMNT PATIENT-IMP: NORMAL
BUN SERPL-MCNC: 8 MG/DL (ref 7–30)
CALCIUM SERPL-MCNC: 8.2 MG/DL (ref 8.5–10.1)
CHLORIDE SERPL-SCNC: 105 MMOL/L (ref 94–109)
CO2 SERPL-SCNC: 22 MMOL/L (ref 20–32)
CREAT SERPL-MCNC: 0.44 MG/DL (ref 0.52–1.04)
DIFFERENTIAL METHOD BLD: ABNORMAL
EOSINOPHIL # BLD AUTO: 0.1 10E9/L (ref 0–0.7)
EOSINOPHIL NFR BLD AUTO: 0.9 %
ERYTHROCYTE [DISTWIDTH] IN BLOOD BY AUTOMATED COUNT: 15.5 % (ref 10–15)
GFR SERPL CREATININE-BSD FRML MDRD: >90 ML/MIN/{1.73_M2}
GLUCOSE SERPL-MCNC: 151 MG/DL (ref 70–99)
HCT VFR BLD AUTO: 36.6 % (ref 35–47)
HGB BLD-MCNC: 12.6 G/DL (ref 11.7–15.7)
IMM GRANULOCYTES # BLD: 0 10E9/L (ref 0–0.4)
IMM GRANULOCYTES NFR BLD: 0.2 %
INR PPP: 1.46 (ref 0.86–1.14)
LABORATORY COMMENT REPORT: NORMAL
LIPASE SERPL-CCNC: 83 U/L (ref 73–393)
LYMPHOCYTES # BLD AUTO: 0.8 10E9/L (ref 0.8–5.3)
LYMPHOCYTES NFR BLD AUTO: 8.6 %
MCH RBC QN AUTO: 30.7 PG (ref 26.5–33)
MCHC RBC AUTO-ENTMCNC: 34.4 G/DL (ref 31.5–36.5)
MCV RBC AUTO: 89 FL (ref 78–100)
MONOCYTES # BLD AUTO: 0.6 10E9/L (ref 0–1.3)
MONOCYTES NFR BLD AUTO: 6.4 %
NEUTROPHILS # BLD AUTO: 7.6 10E9/L (ref 1.6–8.3)
NEUTROPHILS NFR BLD AUTO: 82.7 %
NRBC # BLD AUTO: 0 10*3/UL
NRBC BLD AUTO-RTO: 0 /100
PLATELET # BLD AUTO: 100 10E9/L (ref 150–450)
POTASSIUM SERPL-SCNC: 3.8 MMOL/L (ref 3.4–5.3)
PROT SERPL-MCNC: 7.9 G/DL (ref 6.8–8.8)
RBC # BLD AUTO: 4.1 10E12/L (ref 3.8–5.2)
SARS-COV-2 RNA RESP QL NAA+PROBE: NEGATIVE
SODIUM SERPL-SCNC: 138 MMOL/L (ref 133–144)
SPECIMEN EXP DATE BLD: NORMAL
SPECIMEN SOURCE: NORMAL
WBC # BLD AUTO: 9.2 10E9/L (ref 4–11)

## 2021-05-24 PROCEDURE — 99285 EMERGENCY DEPT VISIT HI MDM: CPT | Mod: 25 | Performed by: FAMILY MEDICINE

## 2021-05-24 PROCEDURE — 250N000011 HC RX IP 250 OP 636: Performed by: FAMILY MEDICINE

## 2021-05-24 PROCEDURE — C9803 HOPD COVID-19 SPEC COLLECT: HCPCS | Performed by: FAMILY MEDICINE

## 2021-05-24 PROCEDURE — 87635 SARS-COV-2 COVID-19 AMP PRB: CPT | Performed by: FAMILY MEDICINE

## 2021-05-24 PROCEDURE — 83690 ASSAY OF LIPASE: CPT | Performed by: FAMILY MEDICINE

## 2021-05-24 PROCEDURE — 85025 COMPLETE CBC W/AUTO DIFF WBC: CPT | Performed by: FAMILY MEDICINE

## 2021-05-24 PROCEDURE — 96361 HYDRATE IV INFUSION ADD-ON: CPT | Performed by: FAMILY MEDICINE

## 2021-05-24 PROCEDURE — 85610 PROTHROMBIN TIME: CPT | Performed by: FAMILY MEDICINE

## 2021-05-24 PROCEDURE — 96374 THER/PROPH/DIAG INJ IV PUSH: CPT | Performed by: FAMILY MEDICINE

## 2021-05-24 PROCEDURE — 258N000003 HC RX IP 258 OP 636: Performed by: FAMILY MEDICINE

## 2021-05-24 PROCEDURE — 86850 RBC ANTIBODY SCREEN: CPT | Performed by: FAMILY MEDICINE

## 2021-05-24 PROCEDURE — C9113 INJ PANTOPRAZOLE SODIUM, VIA: HCPCS | Performed by: FAMILY MEDICINE

## 2021-05-24 PROCEDURE — 96375 TX/PRO/DX INJ NEW DRUG ADDON: CPT | Performed by: FAMILY MEDICINE

## 2021-05-24 PROCEDURE — 86900 BLOOD TYPING SEROLOGIC ABO: CPT | Performed by: FAMILY MEDICINE

## 2021-05-24 PROCEDURE — 86901 BLOOD TYPING SEROLOGIC RH(D): CPT | Performed by: FAMILY MEDICINE

## 2021-05-24 PROCEDURE — 80053 COMPREHEN METABOLIC PANEL: CPT | Performed by: FAMILY MEDICINE

## 2021-05-24 PROCEDURE — 99285 EMERGENCY DEPT VISIT HI MDM: CPT | Performed by: FAMILY MEDICINE

## 2021-05-24 RX ORDER — OCTREOTIDE ACETATE 100 UG/ML
50 INJECTION, SOLUTION INTRAVENOUS; SUBCUTANEOUS ONCE
Status: DISCONTINUED | OUTPATIENT
Start: 2021-05-24 | End: 2021-05-24

## 2021-05-24 RX ORDER — OCTREOTIDE ACETATE 200 UG/ML
50 INJECTION INTRAVENOUS ONCE
Status: COMPLETED | OUTPATIENT
Start: 2021-05-24 | End: 2021-05-24

## 2021-05-24 RX ORDER — ONDANSETRON 2 MG/ML
4 INJECTION INTRAMUSCULAR; INTRAVENOUS ONCE
Status: COMPLETED | OUTPATIENT
Start: 2021-05-24 | End: 2021-05-24

## 2021-05-24 RX ORDER — ACAMPROSATE CALCIUM 333 MG/1
2 TABLET, DELAYED RELEASE ORAL 3 TIMES DAILY
COMMUNITY
Start: 2021-04-27

## 2021-05-24 RX ORDER — SODIUM CHLORIDE 9 MG/ML
INJECTION, SOLUTION INTRAVENOUS CONTINUOUS
Status: DISCONTINUED | OUTPATIENT
Start: 2021-05-24 | End: 2021-05-24 | Stop reason: HOSPADM

## 2021-05-24 RX ADMIN — OCTREOTIDE ACETATE 50 MCG/HR: 200 INJECTION, SOLUTION INTRAVENOUS; SUBCUTANEOUS at 19:02

## 2021-05-24 RX ADMIN — ONDANSETRON 4 MG: 2 INJECTION INTRAMUSCULAR; INTRAVENOUS at 18:21

## 2021-05-24 RX ADMIN — SODIUM CHLORIDE 1000 ML: 9 INJECTION, SOLUTION INTRAVENOUS at 18:49

## 2021-05-24 RX ADMIN — PANTOPRAZOLE SODIUM 40 MG: 40 INJECTION, POWDER, FOR SOLUTION INTRAVENOUS at 18:49

## 2021-05-24 RX ADMIN — OCTREOTIDE ACETATE 50 MCG: 200 INJECTION, SOLUTION INTRAVENOUS; SUBCUTANEOUS at 19:01

## 2021-05-24 RX ADMIN — SODIUM CHLORIDE: 9 INJECTION, SOLUTION INTRAVENOUS at 20:29

## 2021-05-24 NOTE — ED PROVIDER NOTES
"  HPI   The patient is a 56-year-old female presenting with vomiting and diarrhea.  She has seen bloody vomit and bloody diarrhea.  She has a known history of cirrhosis.  She continues to drink alcohol though she says, \"I am trying to quit.\"  Her last drink is reported to be 2 days ago.  She has had an upper endoscopy as recently as 2020.  There was grade 1 varices identified and the prior Mikaela-en-Y gastric bypass.    The patient received her second Covid vaccine today at about 2:30 PM., maker unknown.  She began to feel sick at about 5:00 PM.  She felt nauseous and she began to vomit.  She says, \"I have thrown up too many times to count.\"  She has recognized bright red blood in the most recent episodes.  She also describes multiple episodes of diarrhea.  She says the last few were black and had blood.  She has felt slight lightheadedness.  No fainting.  No chest pain.  No shortness of breath.  She tells me that her abdomen feels nauseous but she denies significant pain.  No back or flank pain.  No skin rash.  No recent trauma or injury.  She denies fever.  No cough or congestion.        Allergies:  Allergies   Allergen Reactions     Vicodin [Hydrocodone-Acetaminophen] Other (See Comments) and Rash     Uncontrollable shaking.  Per PT.     Acyclovir Other (See Comments), Hives and Rash     Cold sores     Augmentin Other (See Comments), Hives and Rash     Cold sores       Benadryl [Pharbedryl] Other (See Comments), Hives and Rash     Cold sores     Cephalosporins Other (See Comments), Hives and Rash     Cold sores     Sulfa Drugs Other (See Comments), Hives and Rash     Cold sores     Problem List:    Patient Active Problem List    Diagnosis Date Noted     Liver nodule 08/15/2020     Priority: Medium     S/P laparoscopic appendectomy 08/26/2017     Priority: Medium     AR (allergic rhinitis) 01/31/2013     Priority: Medium     Chronic generalized abdominal pain 01/09/2013     Priority: Medium      Past Medical " History:    Past Medical History:   Diagnosis Date     Anxiety state, unspecified      Back pain      Diffuse cystic mastopathy      Ovarian cyst      Postcoital bleeding      Tobacco use disorder      Unspecified sinusitis (chronic)      Urinary tract infection, site not specified      Past Surgical History:    Past Surgical History:   Procedure Laterality Date     ESOPHAGOSCOPY, GASTROSCOPY, DUODENOSCOPY (EGD), COMBINED  1/18/2013    Procedure: COMBINED ESOPHAGOSCOPY, GASTROSCOPY, DUODENOSCOPY (EGD), BIOPSY SINGLE OR MULTIPLE;  Gastroscopy;  Surgeon: Leann Alicea MD;  Location: [facility]     GASTRIC BYPASS      x 2     HERNIA REPAIR      with mesh     LAPAROSCOPIC APPENDECTOMY N/A 8/26/2017    Procedure: LAPAROSCOPIC APPENDECTOMY;;  Surgeon: Lazaro Fong MD;  Location: [facility]     MAMMOPLASTY REDUCTION BILATERAL       TUBAL LIGATION       Family History:    History reviewed. No pertinent family history.  Social History:  Marital Status:  Single [1]  Social History     Tobacco Use     Smoking status: Current Every Day Smoker     Smokeless tobacco: Never Used     Tobacco comment: 1-2 cigarettes   Substance Use Topics     Alcohol use: Yes     Comment: social     Drug use: No      Medications:    acamprosate (CAMPRAL) 333 MG EC tablet  clobetasol (TEMOVATE) 0.05 % external solution  fluticasone (FLONASE) 50 MCG/ACT spray  Multiple Vitamin (MULTIVITAMIN) per tablet  omeprazole (PRILOSEC) 20 MG DR capsule  triamcinolone (KENALOG) 0.1 % lotion      Review of Systems   All other systems reviewed and are negative.      PE   BP: (!) 130/91  Pulse: 117  Temp: 97.6  F (36.4  C)  Resp: 20  Weight: 72.6 kg (160 lb)  SpO2: 95 %  Physical Exam  Vitals signs reviewed.   Constitutional:       General: She is in acute distress.      Appearance: She is well-developed.      Comments: The patient is holding an emesis bag as I enter the room.  She has blood streaked on her mouth.  She is sitting up in bed and looks  uncomfortable.  She is talking in full sentences.  Mild tremor.   HENT:      Head: Normocephalic and atraumatic.      Right Ear: External ear normal.      Left Ear: External ear normal.      Nose: Nose normal.      Mouth/Throat:      Mouth: Mucous membranes are moist.      Pharynx: Oropharynx is clear.      Comments: Some blood streaking on her mouth.  Emesis bag full of vomit and bright red blood.  Eyes:      Extraocular Movements: Extraocular movements intact.      Conjunctiva/sclera: Conjunctivae normal.      Pupils: Pupils are equal, round, and reactive to light.   Neck:      Musculoskeletal: Normal range of motion.   Cardiovascular:      Rate and Rhythm: Regular rhythm. Tachycardia present.   Pulmonary:      Effort: Pulmonary effort is normal.      Breath sounds: Normal breath sounds.   Abdominal:      Palpations: Abdomen is soft.      Tenderness: There is no abdominal tenderness.   Musculoskeletal: Normal range of motion.   Skin:     General: Skin is warm and dry.   Neurological:      Mental Status: She is alert and oriented to person, place, and time.   Psychiatric:         Behavior: Behavior normal.         ED COURSE and [facility]   1837.  Patient presents with hematemesis and melena.  She has tachycardia.  She has a known history of varices.  She has had gastric bypass.  Symptoms began at 5:00, about 1-1/2 hours ago.  Second IV ordered.  Lab values pending.  Type and cross.    1853.  Hemoglobin within normal limits.  Patient remains tachycardic.  She has not had another episode of vomiting since I was in the room.  She has received Zofran and she is receiving the other medication currently.  Awaiting callback from an [facility] hospital.    1906.  Patient accepted by Dr. Galarza at [facility].  The patient is an [facility] patient and she is requesting [facility].  No additional orders requested by the accepting physician.  Covid test still pending at the time of this dictation.    LABS  Labs  Ordered and Resulted from Time of ED Arrival Up to the Time of Departure from the ED   CBC WITH PLATELETS DIFFERENTIAL - Abnormal; Notable for the following components:       Result Value    RDW 15.5 (*)     Platelet Count 100 (*)     All other components within normal limits   COMPREHENSIVE METABOLIC PANEL - Abnormal; Notable for the following components:    Glucose 151 (*)     Creatinine 0.44 (*)     Calcium 8.2 (*)     Bilirubin Total 1.5 (*)     ALT 78 (*)      (*)     All other components within normal limits   INR - Abnormal; Notable for the following components:    INR 1.46 (*)     All other components within normal limits   SARS-COV-2 (COVID-19) VIRUS RT-PCR   LIPASE   ABO/RH TYPE AND SCREEN       IMAGING  Images reviewed by me.  Radiology report also reviewed.  No orders to display       Procedures    Medications   ondansetron (ZOFRAN) injection 4 mg (4 mg Intravenous Given 5/24/21 1821)   0.9% sodium chloride BOLUS (0 mLs Intravenous Stopped 5/24/21 2028)   pantoprazole (PROTONIX) IV push injection 40 mg (40 mg Intravenous Given 5/24/21 1849)   octreotide (sandoSTATIN) injection 50 mcg (50 mcg Intravenous Given 5/24/21 1901)         IMPRESSION       ICD-10-CM    1. Gastrointestinal hemorrhage with hematemesis  K92.0    2. Secondary esophageal varices with bleeding (H)  I85.11    3. Alcoholic cirrhosis of liver without ascites (H)  K70.30             Medication List      There are no discharge medications for this visit.                 Critical Care Documentation  My initial assessment included review of prehospital provider report, review of nursing observations, review of vital signs, focused history, physical exam, review of cardiac rhythm monitor and discussion with Dr. Galarza.  It was determined that the patient had severe hemorrhage.  This required immediate intervention and critical care decision-making.     Critical care time: 72 minutes.            Don Muhammad MD  05/24/21 6344

## 2021-05-25 NOTE — ED NOTES
Pt was brought to ER by friend, pt started vomiting at 1700, also had diarrhea with bright red blood. Pt was diaphoretic, had severe tremors, and was vomiting up BRB so was brought back to room 4 immed. Pt says she had an episode of same in August and was told it was her cirrhosis, pt denies drinking much alcohol, said she had 1 [employer] canned drink on Saturday.  Pt friend said pt drinks alcohol every day.

## 2021-05-29 ENCOUNTER — RECORDS - HEALTHEAST (OUTPATIENT)
Dept: ADMINISTRATIVE | Facility: CLINIC | Age: 56
End: 2021-05-29

## 2021-06-04 VITALS — HEIGHT: 63 IN | WEIGHT: 158 LBS | BODY MASS INDEX: 28 KG/M2

## 2021-07-20 ENCOUNTER — MEDICAL CORRESPONDENCE (OUTPATIENT)
Dept: HEALTH INFORMATION MANAGEMENT | Facility: CLINIC | Age: 56
End: 2021-07-20

## 2021-07-26 ENCOUNTER — TRANSFERRED RECORDS (OUTPATIENT)
Dept: HEALTH INFORMATION MANAGEMENT | Facility: CLINIC | Age: 56
End: 2021-07-26

## (undated) DEVICE — SU VICRYL 0 UR-6 27" J603H

## (undated) DEVICE — SOL NACL 0.9% INJ 1000ML BAG 07983-09

## (undated) DEVICE — STOCKING SLEEVE COMPRESSION CALF MED

## (undated) DEVICE — SOL WATER IRRIG 1000ML BOTTLE 07139-09

## (undated) DEVICE — SOL NACL 0.9% IRRIG 1000ML BOTTLE 07138-09

## (undated) DEVICE — ENDO TROCAR OPTICAL 05MM VERSAPORT PLUS W/FIX CAN ONB5STF

## (undated) DEVICE — PREP CHLORAPREP 26ML TINTED ORANGE  260815

## (undated) DEVICE — SU VICRYL 4-0 FS-2 27" J422-H

## (undated) DEVICE — ENDO CANNULA 05MM VERSAONE UNIVERSAL UNVCA5STF

## (undated) DEVICE — GLOVE PROTEXIS W/NEU-THERA 7.5  2D73TE75

## (undated) DEVICE — DRAPE POUCH INSTRUMENT 3 POCKET 1018L

## (undated) DEVICE — ENDO POUCH GOLD 10MM ECATCH 173050G

## (undated) DEVICE — DECANTER VIAL 2006S

## (undated) DEVICE — Device

## (undated) DEVICE — ESU HOLSTER PLASTIC DISP E2400

## (undated) DEVICE — SU ENDO POLYSORB 0 3" LOOP 21" EL-21-L

## (undated) DEVICE — ENDO TROCAR BLUNT 100MM W/THRD ANCHOR BLUNTPORT BPT12STS

## (undated) DEVICE — SUCTION IRR STRYKERFLOW II W/TIP 250-070-520

## (undated) DEVICE — ADHESIVE SWIFTSET 0.8ML OCTYL SS6

## (undated) DEVICE — GOWN XLG DISP 9545

## (undated) DEVICE — ESU LIGASURE LAPAROSCOPIC BLUNT TIP SEALER 5MMX37CM LF1637

## (undated) RX ORDER — DEXAMETHASONE SODIUM PHOSPHATE 4 MG/ML
INJECTION, SOLUTION INTRA-ARTICULAR; INTRALESIONAL; INTRAMUSCULAR; INTRAVENOUS; SOFT TISSUE
Status: DISPENSED
Start: 2017-08-26

## (undated) RX ORDER — BUPIVACAINE HYDROCHLORIDE AND EPINEPHRINE 2.5; 5 MG/ML; UG/ML
INJECTION, SOLUTION INFILTRATION; PERINEURAL
Status: DISPENSED
Start: 2017-08-26

## (undated) RX ORDER — ONDANSETRON 2 MG/ML
INJECTION INTRAMUSCULAR; INTRAVENOUS
Status: DISPENSED
Start: 2017-08-26

## (undated) RX ORDER — LIDOCAINE HYDROCHLORIDE 10 MG/ML
INJECTION, SOLUTION EPIDURAL; INFILTRATION; INTRACAUDAL; PERINEURAL
Status: DISPENSED
Start: 2017-08-26

## (undated) RX ORDER — KETOROLAC TROMETHAMINE 30 MG/ML
INJECTION, SOLUTION INTRAMUSCULAR; INTRAVENOUS
Status: DISPENSED
Start: 2017-08-26

## (undated) RX ORDER — GLYCOPYRROLATE 0.2 MG/ML
INJECTION, SOLUTION INTRAMUSCULAR; INTRAVENOUS
Status: DISPENSED
Start: 2017-08-26

## (undated) RX ORDER — FENTANYL CITRATE 50 UG/ML
INJECTION, SOLUTION INTRAMUSCULAR; INTRAVENOUS
Status: DISPENSED
Start: 2017-08-26

## (undated) RX ORDER — PROPOFOL 10 MG/ML
INJECTION, EMULSION INTRAVENOUS
Status: DISPENSED
Start: 2017-08-26